# Patient Record
Sex: MALE | Race: WHITE | NOT HISPANIC OR LATINO | Employment: FULL TIME | URBAN - NONMETROPOLITAN AREA
[De-identification: names, ages, dates, MRNs, and addresses within clinical notes are randomized per-mention and may not be internally consistent; named-entity substitution may affect disease eponyms.]

---

## 2021-11-22 ENCOUNTER — OFFICE VISIT (OUTPATIENT)
Dept: FAMILY MEDICINE CLINIC | Facility: CLINIC | Age: 41
End: 2021-11-22

## 2021-11-22 VITALS
DIASTOLIC BLOOD PRESSURE: 78 MMHG | TEMPERATURE: 97.4 F | HEART RATE: 103 BPM | HEIGHT: 69 IN | BODY MASS INDEX: 20.44 KG/M2 | WEIGHT: 138 LBS | RESPIRATION RATE: 18 BRPM | OXYGEN SATURATION: 95 % | SYSTOLIC BLOOD PRESSURE: 111 MMHG

## 2021-11-22 DIAGNOSIS — F41.9 ANXIETY: ICD-10-CM

## 2021-11-22 DIAGNOSIS — L50.9 URTICARIA: ICD-10-CM

## 2021-11-22 DIAGNOSIS — Z86.2 HISTORY OF LEUKOCYTOSIS: ICD-10-CM

## 2021-11-22 DIAGNOSIS — Z86.2 HISTORY OF LEUKOCYTOSIS: Primary | ICD-10-CM

## 2021-11-22 DIAGNOSIS — J30.2 SEASONAL ALLERGIES: ICD-10-CM

## 2021-11-22 LAB
BASOPHILS # BLD AUTO: 0.08 10*3/MM3 (ref 0–0.2)
BASOPHILS NFR BLD AUTO: 0.5 % (ref 0–1.5)
DEPRECATED RDW RBC AUTO: 53.5 FL (ref 37–54)
EOSINOPHIL # BLD AUTO: 0.37 10*3/MM3 (ref 0–0.4)
EOSINOPHIL NFR BLD AUTO: 2.4 % (ref 0.3–6.2)
ERYTHROCYTE [DISTWIDTH] IN BLOOD BY AUTOMATED COUNT: 14.2 % (ref 12.3–15.4)
HCT VFR BLD AUTO: 44.3 % (ref 37.5–51)
HGB BLD-MCNC: 14.3 G/DL (ref 13–17.7)
IMM GRANULOCYTES # BLD AUTO: 0.05 10*3/MM3 (ref 0–0.05)
IMM GRANULOCYTES NFR BLD AUTO: 0.3 % (ref 0–0.5)
LYMPHOCYTES # BLD AUTO: 2.67 10*3/MM3 (ref 0.7–3.1)
LYMPHOCYTES NFR BLD AUTO: 17.2 % (ref 19.6–45.3)
MCH RBC QN AUTO: 32.8 PG (ref 26.6–33)
MCHC RBC AUTO-ENTMCNC: 32.3 G/DL (ref 31.5–35.7)
MCV RBC AUTO: 101.6 FL (ref 79–97)
MONOCYTES # BLD AUTO: 1.04 10*3/MM3 (ref 0.1–0.9)
MONOCYTES NFR BLD AUTO: 6.7 % (ref 5–12)
NEUTROPHILS NFR BLD AUTO: 11.28 10*3/MM3 (ref 1.7–7)
NEUTROPHILS NFR BLD AUTO: 72.9 % (ref 42.7–76)
NRBC BLD AUTO-RTO: 0 /100 WBC (ref 0–0.2)
PLATELET # BLD AUTO: 364 10*3/MM3 (ref 140–450)
PMV BLD AUTO: 10.9 FL (ref 6–12)
RBC # BLD AUTO: 4.36 10*6/MM3 (ref 4.14–5.8)
WBC NRBC COR # BLD: 15.49 10*3/MM3 (ref 3.4–10.8)

## 2021-11-22 PROCEDURE — 99203 OFFICE O/P NEW LOW 30 MIN: CPT | Performed by: NURSE PRACTITIONER

## 2021-11-22 PROCEDURE — 85025 COMPLETE CBC W/AUTO DIFF WBC: CPT | Performed by: NURSE PRACTITIONER

## 2021-11-22 RX ORDER — BUSPIRONE HYDROCHLORIDE 7.5 MG/1
7.5 TABLET ORAL 2 TIMES DAILY
Qty: 60 TABLET | Refills: 0 | Status: SHIPPED | OUTPATIENT
Start: 2021-11-22 | End: 2021-12-20

## 2021-11-22 RX ORDER — CETIRIZINE HYDROCHLORIDE 10 MG/1
10 TABLET ORAL DAILY
Qty: 30 TABLET | Refills: 2 | Status: SHIPPED | OUTPATIENT
Start: 2021-11-22 | End: 2022-01-24 | Stop reason: SDUPTHER

## 2021-11-22 RX ORDER — HYDROXYZINE HYDROCHLORIDE 25 MG/1
25 TABLET, FILM COATED ORAL 3 TIMES DAILY PRN
Qty: 90 TABLET | Refills: 2 | Status: SHIPPED | OUTPATIENT
Start: 2021-11-22 | End: 2022-01-03

## 2021-11-22 RX ORDER — BUSPIRONE HYDROCHLORIDE 7.5 MG/1
7.5 TABLET ORAL 2 TIMES DAILY
COMMUNITY
End: 2021-11-22 | Stop reason: SDUPTHER

## 2021-11-22 NOTE — PROGRESS NOTES
"Chief Complaint  Med Refill (Buspar for anxiety & (HTN?)) and CBC (Patients spouse wants him to have CBC done stating that WBC was over 30,000 and slowly went down.)    Subjective          Tobias Mariscal presents to [facility] for acute care (anxiety/leukocytosis).    Anxiety  Presents for initial visit. Onset was 6 to 12 months ago. The problem has been gradually improving. Symptoms include irritability and nervous/anxious behavior. Patient reports no chest pain, compulsions, confusion, decreased concentration, depressed mood, dizziness, dry mouth, excessive worry, feeling of choking, hyperventilation, impotence, insomnia, malaise, muscle tension, nausea, obsessions, palpitations, panic, restlessness, shortness of breath or suicidal ideas. Symptoms occur most days. The severity of symptoms is mild. The symptoms are aggravated by work stress (lack of sleep (gets woken up at night)). The quality of sleep is good. Nighttime awakenings: occasional.     His past medical history is significant for anxiety/panic attacks. There is no history of asthma, depression or suicide attempts. Treatments tried: Buspar. The treatment provided significant relief. Compliance with prior treatments has been good.       Objective   Vital Signs:   /78   Pulse 103   Temp 97.4 °F (36.3 °C) (Temporal)   Resp 18   Ht 175.3 cm (69\")   Wt 62.6 kg (138 lb)   SpO2 95%   BMI 20.38 kg/m²     Physical Exam  Vitals and nursing note reviewed.   Constitutional:       General: He is awake.      Appearance: Normal appearance.   HENT:      Head: Normocephalic.      Right Ear: Hearing and tympanic membrane normal.      Left Ear: Hearing and tympanic membrane normal. Swelling and tenderness present.      Nose: Nose normal.      Mouth/Throat:      Lips: Pink.      Mouth: Mucous membranes are moist.      Dentition: Abnormal dentition.      Pharynx: Posterior oropharyngeal erythema present. No pharyngeal swelling. "   Eyes:      General: Lids are normal.      Conjunctiva/sclera: Conjunctivae normal.      Pupils: Pupils are equal, round, and reactive to light.   Cardiovascular:      Rate and Rhythm: Normal rate and regular rhythm.      Heart sounds: Normal heart sounds.   Pulmonary:      Effort: Pulmonary effort is normal.      Breath sounds: Normal breath sounds.   Abdominal:      General: Abdomen is flat. Bowel sounds are normal.      Palpations: Abdomen is soft.      Tenderness: There is no abdominal tenderness.   Musculoskeletal:         General: Normal range of motion.      Cervical back: Normal range of motion.   Skin:     General: Skin is warm and dry.      Capillary Refill: Capillary refill takes less than 2 seconds.   Neurological:      Mental Status: He is alert and oriented to person, place, and time.      Sensory: Sensation is intact.      Motor: Motor function is intact.      Coordination: Coordination is intact.      Gait: Gait is intact.   Psychiatric:         Attention and Perception: Attention normal.         Mood and Affect: Affect normal. Mood is anxious.         Speech: Speech normal.         Behavior: Behavior normal. Behavior is cooperative.         Thought Content: Thought content normal.        Result Review :   The following data was reviewed by: WOODROW Sahu on 11/22/2021:      Assessment and Plan    Diagnoses and all orders for this visit:    1. History of leukocytosis (Primary)  -     CBC w AUTO Differential; Future    2. Urticaria  -     Ambulatory Referral to Allergy    3. Seasonal allergies  -     cetirizine (zyrTEC) 10 MG tablet; Take 1 tablet by mouth Daily.  Dispense: 30 tablet; Refill: 2    4. Anxiety  -     hydrOXYzine (ATARAX) 25 MG tablet; Take 1 tablet by mouth 3 (Three) Times a Day As Needed for Itching or Anxiety.  Dispense: 90 tablet; Refill: 2  -     busPIRone (BUSPAR) 7.5 MG tablet; Take 1 tablet by mouth 2 (Two) Times a Day.  Dispense: 60 tablet; Refill: 0    I spent 20 minutes  caring for Tobias on this date of service. This time includes time spent by me in the following activities:preparing for the visit, reviewing tests, obtaining and/or reviewing a separately obtained history, performing a medically appropriate examination and/or evaluation , counseling and educating the patient/family/caregiver, ordering medications, tests, or procedures, referring and communicating with other health care professionals , documenting information in the medical record, independently interpreting results and communicating that information with the patient/family/caregiver and care coordination  Follow Up   Return if symptoms worsen or fail to improve.  Patient was given instructions and counseling regarding his condition or for health maintenance advice. Please see specific information pulled into the AVS if appropriate.       This document has been electronically signed by WOODROW Sahu  November 22, 2021 14:23 EST

## 2021-11-22 NOTE — PATIENT INSTRUCTIONS
Allergic Rhinitis, Adult    Allergic rhinitis is an allergic reaction that affects the mucous membrane inside the nose. The mucous membrane is the tissue that produces mucus.  There are two types of allergic rhinitis:  · Seasonal. This type is also called hay fever and happens only during certain seasons.  · Perennial. This type can happen at any time of the year.  Allergic rhinitis cannot be spread from person to person. This condition can be mild, moderate, or severe. It can develop at any age and may be outgrown.  What are the causes?  This condition is caused by allergens. These are things that can cause an allergic reaction. Allergens may differ for seasonal allergic rhinitis and perennial allergic rhinitis.  · Seasonal allergic rhinitis is triggered by pollen. Pollen can come from grasses, trees, and weeds.  · Perennial allergic rhinitis may be triggered by:  ? Dust mites.  ? Proteins in a pet's urine, saliva, or dander. Dander is dead skin cells from a pet.  ? Smoke, mold, or car fumes.  What increases the risk?  You are more likely to develop this condition if you have a family history of allergies or other conditions related to allergies, including:  · Allergic conjunctivitis. This is inflammation of parts of the eyes and eyelids.  · Asthma. This condition affects the lungs and makes it hard to breathe.  · Atopic dermatitis or eczema. This is long term (chronic) inflammation of the skin.  · Food allergies.  What are the signs or symptoms?  Symptoms of this condition include:  · Sneezing or coughing.  · A stuffy nose (nasal congestion), itchy nose, or nasal discharge.  · Itchy eyes and tearing of the eyes.  · A feeling of mucus dripping down the back of your throat (postnasal drip).  · Trouble sleeping.  · Tiredness or fatigue.  · Headache.  · Sore throat.  How is this diagnosed?  This condition may be diagnosed with your symptoms, medical history, and physical exam. Your health care provider may check  for related conditions, such as:  · Asthma.  · Pink eye. This is eye inflammation caused by infection (conjunctivitis).  · Ear infection.  · Upper respiratory infection. This is an infection in the nose, throat, or upper airways.  You may also have tests to find out which allergens trigger your symptoms. These may include skin tests or blood tests.  How is this treated?  There is no cure for this condition, but treatment can help control symptoms. Treatment may include:  · Taking medicines that block allergy symptoms, such as corticosteroids and antihistamines. Medicine may be given as a shot, nasal spray, or pill.  · Avoiding any allergens.  · Being exposed again and again to tiny amounts of allergens to help you build a defense against allergens (immunotherapy). This is done if other treatments have not helped. It may include:  ? Allergy shots. These are injected medicines that have small amounts of allergen in them.  ? Sublingual immunotherapy. This involves taking small doses of a medicine with allergen in it under your tongue.  If these treatments do not work, your health care provider may prescribe newer, stronger medicines.  Follow these instructions at home:  Avoiding allergens  Find out what you are allergic to and avoid those allergens. These are some things you can do to help avoid allergens:  · If you have perennial allergies:  ? Replace carpet with wood, tile, or vinyl devon. Carpet can trap dander and dust.  ? Do not smoke. Do not allow smoking in your home.  ? Change your heating and air conditioning filters at least once a month.  · If you have seasonal allergies, take these steps during allergy season:  ? Keep windows closed as much as possible.  ? Plan outdoor activities when pollen counts are lowest. Check pollen counts before you plan outdoor activities.  ? When coming indoors, change clothing and shower before sitting on furniture or bedding.  · If you have a pet in the house that produces  allergens:  ? Keep the pet out of the bedroom.  ? Vacuum, sweep, and dust regularly.  General instructions  · Take over-the-counter and prescription medicines only as told by your health care provider.  · Drink enough fluid to keep your urine pale yellow.  · Keep all follow-up visits as told by your health care provider. This is important.  Where to find more information  · American Academy of Allergy, Asthma & Immunology: www.aaaai.org  Contact a health care provider if:  · You have a fever.  · You develop a cough that does not go away.  · You make whistling sounds when you breathe (wheeze).  · Your symptoms slow you down or stop you from doing your normal activities each day.  Get help right away if:  · You have shortness of breath.  This symptom may represent a serious problem that is an emergency. Do not wait to see if the symptom will go away. Get medical help right away. Call your local emergency services (911 in the [place] Do not drive yourself to the hospital.  Summary  · Allergic rhinitis may be managed by taking medicines as directed and avoiding allergens.  · If you have seasonal allergies, keep windows closed as much as possible during allergy season.  · Contact your health care provider if you develop a fever or a cough that does not go away.  This information is not intended to replace advice given to you by your health care provider. Make sure you discuss any questions you have with your health care provider.  Document Revised: 02/05/2021 Document Reviewed: 12/15/2020  [employer] Patient Education © 2021 [employer]    Managing Anxiety, Adult  After being diagnosed with an anxiety disorder, you may be relieved to know why you have felt or behaved a certain way. You may also feel overwhelmed about the treatment ahead and what it will mean for your life. With care and support, you can manage this condition and recover from it.  How to manage lifestyle changes  Managing stress and anxiety    Stress is  your body's reaction to life changes and events, both good and bad. Most stress will last just a few hours, but stress can be ongoing and can lead to more than just stress. Although stress can play a major role in anxiety, it is not the same as anxiety. Stress is usually caused by something external, such as a deadline, test, or competition. Stress normally passes after the triggering event has ended.   Anxiety is caused by something internal, such as imagining a terrible outcome or worrying that something will go wrong that will devastate you. Anxiety often does not go away even after the triggering event is over, and it can become long-term (chronic) worry. It is important to understand the differences between stress and anxiety and to manage your stress effectively so that it does not lead to an anxious response.  Talk with your health care provider or a counselor to learn more about reducing anxiety and stress. He or she may suggest tension reduction techniques, such as:  · Music therapy. This can include creating or listening to music that you enjoy and that inspires you.  · Mindfulness-based meditation. This involves being aware of your normal breaths while not trying to control your breathing. It can be done while sitting or walking.  · Centering prayer. This involves focusing on a word, phrase, or sacred image that means something to you and brings you peace.  · Deep breathing. To do this, expand your stomach and inhale slowly through your nose. Hold your breath for 3-5 seconds. Then exhale slowly, letting your stomach muscles relax.  · Self-talk. This involves identifying thought patterns that lead to anxiety reactions and changing those patterns.  · Muscle relaxation. This involves tensing muscles and then relaxing them.  Choose a tension reduction technique that suits your lifestyle and personality. These techniques take time and practice. Set aside 5-15 minutes a day to do them. Therapists can offer  counseling and training in these techniques. The training to help with anxiety may be covered by some insurance plans. Other things you can do to manage stress and anxiety include:  · Keeping a stress/anxiety diary. This can help you learn what triggers your reaction and then learn ways to manage your response.  · Thinking about how you react to certain situations. You may not be able to control everything, but you can control your response.  · Making time for activities that help you relax and not feeling guilty about spending your time in this way.  · Visual imagery and yoga can help you stay calm and relax.    Medicines  Medicines can help ease symptoms. Medicines for anxiety include:  · Anti-anxiety drugs.  · Antidepressants.  Medicines are often used as a primary treatment for anxiety disorder. Medicines will be prescribed by a health care provider. When used together, medicines, psychotherapy, and tension reduction techniques may be the most effective treatment.  Relationships  Relationships can play a big part in helping you recover. Try to spend more time connecting with trusted friends and family members. Consider going to couples counseling, taking family education classes, or going to family therapy. Therapy can help you and others better understand your condition.  How to recognize changes in your anxiety  Everyone responds differently to treatment for anxiety. Recovery from anxiety happens when symptoms decrease and stop interfering with your daily activities at home or work. This may mean that you will start to:  · Have better concentration and focus. Worry will interfere less in your daily thinking.  · Sleep better.  · Be less irritable.  · Have more energy.  · Have improved memory.  It is important to recognize when your condition is getting worse. Contact your health care provider if your symptoms interfere with home or work and you feel like your condition is not improving.  Follow these  instructions at home:  Activity  · Exercise. Most adults should do the following:  ? Exercise for at least 150 minutes each week. The exercise should increase your heart rate and make you sweat (moderate-intensity exercise).  ? Strengthening exercises at least twice a week.  · Get the right amount and quality of sleep. Most adults need 7-9 hours of sleep each night.  Lifestyle    · Eat a healthy diet that includes plenty of vegetables, fruits, whole grains, low-fat dairy products, and lean protein. Do not eat a lot of foods that are high in solid fats, added sugars, or salt.  · Make choices that simplify your life.  · Do not use any products that contain nicotine or tobacco, such as cigarettes, e-cigarettes, and chewing tobacco. If you need help quitting, ask your health care provider.  · Avoid caffeine, alcohol, and certain over-the-counter cold medicines. These may make you feel worse. Ask your pharmacist which medicines to avoid.    General instructions  · Take over-the-counter and prescription medicines only as told by your health care provider.  · Keep all follow-up visits as told by your health care provider. This is important.  Where to find support  You can get help and support from these sources:  · Self-help groups.  · Online and community organizations.  · A trusted spiritual leader.  · Couples counseling.  · Family education classes.  · Family therapy.  Where to find more information  You may find that joining a support group helps you deal with your anxiety. The following sources can help you locate counselors or support groups near you:  · Mental Health [place]: www.mentalhealthamerica.net  · Anxiety and Depression Association of [place] (ADAA): www.adaa.org  · National [place] [employer] Mental Illness (ANA): www.ana.org  Contact a health care provider if you:  · Have a hard time staying focused or finishing daily tasks.  · Spend many hours a day feeling worried about everyday life.  · Become exhausted by  worry.  · Start to have headaches, feel tense, or have nausea.  · Urinate more than normal.  · Have diarrhea.  Get help right away if you have:  · A racing heart and shortness of breath.  · Thoughts of hurting yourself or others.  If you ever feel like you may hurt yourself or others, or have thoughts about taking your own life, get help right away. You can go to your nearest emergency department or call:  · Your local emergency services (911 in the [place]  · A suicide crisis helpline, such as the National Suicide Prevention Lifeline at 1-632.819.2256. This is open 24 hours a day.  Summary  · Taking steps to learn and use tension reduction techniques can help calm you and help prevent triggering an anxiety reaction.  · When used together, medicines, psychotherapy, and tension reduction techniques may be the most effective treatment.  · Family, friends, and partners can play a big part in helping you recover from an anxiety disorder.  This information is not intended to replace advice given to you by your health care provider. Make sure you discuss any questions you have with your health care provider.  Document Revised: 05/19/2020 Document Reviewed: 05/19/2020  [employer] Patient Education © 2021 [employer]    Hives  Hives (urticaria) are itchy, red, swollen areas on the skin. Hives can appear on any part of the body. Hives often fade within 24 hours (acute hives). Sometimes, new hives appear after old ones fade and the cycle can continue for several days or weeks (chronic hives). Hives do not spread from person to person (are not contagious).  Hives come from the body's reaction to something a person is allergic to (allergen), something that causes irritation, or various other triggers. When a person is exposed to a trigger, his or her body releases a chemical (histamine) that causes redness, itching, and swelling. Hives can appear right after exposure to a trigger or hours later.  What are the causes?  This  condition may be caused by:  · Allergies to foods or ingredients.  · Insect bites or stings.  · Exposure to pollen or pets.  · Contact with latex or chemicals.  · Spending time in sunlight, heat, or cold (exposure).  · Exercise.  · Stress.  · Certain medicines.  You can also get hives from other medical conditions and treatments, such as:  · Viruses, including the common cold.  · Bacterial infections, such as urinary tract infections and strep throat.  · Certain medicines.  · Allergy shots.  · Blood transfusions.  Sometimes, the cause of this condition is not known (idiopathic hives).  What increases the risk?  You are more likely to develop this condition if you:  · Are a woman.  · Have food allergies, especially to citrus fruits, milk, eggs, peanuts, tree nuts, or shellfish.  · Are allergic to:  ? Medicines.  ? Latex.  ? Insects.  ? Animals.  ? Pollen.  What are the signs or symptoms?  Common symptoms of this condition include raised, itchy, red or white bumps or patches on your skin. These areas may:  · Become large and swollen (welts).  · Change in shape and location, quickly and repeatedly.  · Be separate hives or connect over a large area of skin.  · Sting or become painful.  · Turn white when pressed in the center (padilla).  In severe cases, your hands, feet, and face may also become swollen. This may occur if hives develop deeper in your skin.  How is this diagnosed?  This condition may be diagnosed by your symptoms, medical history, and physical exam.  · Your skin, urine, or blood may be tested to find out what is causing your hives and to rule out other health issues.  · Your health care provider may also remove a small sample of skin from the affected area and examine it under a microscope (biopsy).  How is this treated?  Treatment for this condition depends on the cause and severity of your symptoms. Your health care provider may recommend using cool, wet cloths (cool compresses) or taking cool showers  to relieve itching. Treatment may include:  · Medicines that help:  ? Relieve itching (antihistamines).  ? Reduce swelling (corticosteroids).  ? Treat infection (antibiotics).  · An injectable medicine (omalizumab). Your health care provider may prescribe this if you have chronic idiopathic hives and you continue to have symptoms even after treatment with antihistamines.  Severe cases may require an emergency injection of adrenaline (epinephrine) to prevent a life-threatening allergic reaction (anaphylaxis).  Follow these instructions at home:  Medicines  · Take and apply over-the-counter and prescription medicines only as told by your health care provider.  · If you were prescribed an antibiotic medicine, take it as told by your health care provider. Do not stop using the antibiotic even if you start to feel better.  Skin care  · Apply cool compresses to the affected areas.  · Do not scratch or rub your skin.  General instructions  · Do not take hot showers or baths. This can make itching worse.  · Do not wear tight-fitting clothing.  · Use sunscreen and wear protective clothing when you are outside.  · Avoid any substances that cause your hives. Keep a journal to help track what causes your hives. Write down:  ? What medicines you take.  ? What you eat and drink.  ? What products you use on your skin.  · Keep all follow-up visits as told by your health care provider. This is important.  Contact a health care provider if:  · Your symptoms are not controlled with medicine.  · Your joints are painful or swollen.  Get help right away if:  · You have a fever.  · You have pain in your abdomen.  · Your tongue or lips are swollen.  · Your eyelids are swollen.  · Your chest or throat feels tight.  · You have trouble breathing or swallowing.  These symptoms may represent a serious problem that is an emergency. Do not wait to see if the symptoms will go away. Get medical help right away. Call your local emergency services  (911 in the [place] Do not drive yourself to the hospital.  Summary  · Hives (urticaria) are itchy, red, swollen areas on your skin. Hives come from the body's reaction to something a person is allergic to (allergen), something that causes irritation, or various other triggers.  · Treatment for this condition depends on the cause and severity of your symptoms.  · Avoid any substances that cause your hives. Keep a journal to help track what causes your hives.  · Take and apply over-the-counter and prescription medicines only as told by your health care provider.  · Keep all follow-up visits as told by your health care provider. This is important.  This information is not intended to replace advice given to you by your health care provider. Make sure you discuss any questions you have with your health care provider.  Document Revised: 07/03/2019 Document Reviewed: 07/03/2019  [employer] Patient Education © 2021 [employer]

## 2021-11-24 ENCOUNTER — PATIENT ROUNDING (BHMG ONLY) (OUTPATIENT)
Dept: FAMILY MEDICINE CLINIC | Facility: CLINIC | Age: 41
End: 2021-11-24

## 2021-11-24 NOTE — PROGRESS NOTES
November 24, 2021    Hello, may I speak with Tobias Maricsal?    My name is John Ulrich    I am  with [facility]  [address] [address] [phone].    Before we get started may I verify your date of birth? 1980    I am calling to officially welcome you to our practice and ask about your recent visit. Is this a good time to talk?     YES    Tell me about your visit with us. What things went well?    Things went really well Princess was very polite. The staff was great. If I had not misplaced my insurance card the wait time would not have been so long.       We're always looking for ways to make our patients' experiences even better. Do you have recommendations on ways we may improve?      No everything was great    Overall were you satisfied with your first visit to our practice?     YES       I appreciate you taking the time to speak with me today. Is there anything else I can do for you?     Nothing I can think of every thing was great.      Thank you, and have a great day.

## 2021-12-20 ENCOUNTER — OFFICE VISIT (OUTPATIENT)
Dept: FAMILY MEDICINE CLINIC | Facility: CLINIC | Age: 41
End: 2021-12-20

## 2021-12-20 VITALS
HEIGHT: 69 IN | OXYGEN SATURATION: 99 % | HEART RATE: 95 BPM | SYSTOLIC BLOOD PRESSURE: 136 MMHG | DIASTOLIC BLOOD PRESSURE: 76 MMHG | BODY MASS INDEX: 20.97 KG/M2 | TEMPERATURE: 98.2 F | WEIGHT: 141.6 LBS

## 2021-12-20 DIAGNOSIS — R20.2 NUMBNESS AND TINGLING OF RIGHT HAND: ICD-10-CM

## 2021-12-20 DIAGNOSIS — Z00.00 ANNUAL PHYSICAL EXAM: ICD-10-CM

## 2021-12-20 DIAGNOSIS — F41.1 GENERALIZED ANXIETY DISORDER: ICD-10-CM

## 2021-12-20 DIAGNOSIS — Z00.00 ENCOUNTER FOR MEDICAL EXAMINATION TO ESTABLISH CARE: Primary | ICD-10-CM

## 2021-12-20 DIAGNOSIS — F41.9 ANXIETY: ICD-10-CM

## 2021-12-20 DIAGNOSIS — I10 HYPERTENSION, UNSPECIFIED TYPE: ICD-10-CM

## 2021-12-20 DIAGNOSIS — Z23 FLU VACCINE NEED: ICD-10-CM

## 2021-12-20 DIAGNOSIS — R20.0 NUMBNESS AND TINGLING OF RIGHT HAND: ICD-10-CM

## 2021-12-20 DIAGNOSIS — Z23 NEED FOR TDAP VACCINATION: ICD-10-CM

## 2021-12-20 PROBLEM — L30.9 CHRONIC ECZEMA: Status: ACTIVE | Noted: 2021-12-20

## 2021-12-20 PROBLEM — J30.89 ENVIRONMENTAL AND SEASONAL ALLERGIES: Status: ACTIVE | Noted: 2021-12-20

## 2021-12-20 LAB
ALBUMIN SERPL-MCNC: 4.33 G/DL (ref 3.5–5.2)
ALBUMIN/GLOB SERPL: 1.8 G/DL
ALP SERPL-CCNC: 126 U/L (ref 39–117)
ALT SERPL W P-5'-P-CCNC: 17 U/L (ref 1–41)
ANION GAP SERPL CALCULATED.3IONS-SCNC: 12.9 MMOL/L (ref 5–15)
AST SERPL-CCNC: 21 U/L (ref 1–40)
BASOPHILS # BLD AUTO: 0.12 10*3/MM3 (ref 0–0.2)
BASOPHILS NFR BLD AUTO: 0.5 % (ref 0–1.5)
BILIRUB BLD-MCNC: ABNORMAL MG/DL
BILIRUB SERPL-MCNC: 0.3 MG/DL (ref 0–1.2)
BUN SERPL-MCNC: 12 MG/DL (ref 6–20)
BUN/CREAT SERPL: 14.6 (ref 7–25)
CALCIUM SPEC-SCNC: 8.4 MG/DL (ref 8.6–10.5)
CHLORIDE SERPL-SCNC: 100 MMOL/L (ref 98–107)
CHOLEST SERPL-MCNC: 162 MG/DL (ref 0–200)
CLARITY, POC: CLEAR
CO2 SERPL-SCNC: 25.1 MMOL/L (ref 22–29)
COLOR UR: ABNORMAL
CREAT SERPL-MCNC: 0.82 MG/DL (ref 0.76–1.27)
DEPRECATED RDW RBC AUTO: 51 FL (ref 37–54)
EOSINOPHIL # BLD AUTO: 0.32 10*3/MM3 (ref 0–0.4)
EOSINOPHIL NFR BLD AUTO: 1.4 % (ref 0.3–6.2)
ERYTHROCYTE [DISTWIDTH] IN BLOOD BY AUTOMATED COUNT: 13.4 % (ref 12.3–15.4)
GFR SERPL CREATININE-BSD FRML MDRD: 104 ML/MIN/1.73
GLOBULIN UR ELPH-MCNC: 2.5 GM/DL
GLUCOSE SERPL-MCNC: 74 MG/DL (ref 65–99)
GLUCOSE UR STRIP-MCNC: NEGATIVE MG/DL
HCT VFR BLD AUTO: 45.1 % (ref 37.5–51)
HCV AB SER DONR QL: NORMAL
HDLC SERPL-MCNC: 44 MG/DL (ref 40–60)
HGB BLD-MCNC: 14.6 G/DL (ref 13–17.7)
IMM GRANULOCYTES # BLD AUTO: 0.08 10*3/MM3 (ref 0–0.05)
IMM GRANULOCYTES NFR BLD AUTO: 0.4 % (ref 0–0.5)
KETONES UR QL: NEGATIVE
LDLC SERPL CALC-MCNC: 95 MG/DL (ref 0–100)
LDLC/HDLC SERPL: 2.08 {RATIO}
LEUKOCYTE EST, POC: NEGATIVE
LYMPHOCYTES # BLD AUTO: 2.45 10*3/MM3 (ref 0.7–3.1)
LYMPHOCYTES NFR BLD AUTO: 11 % (ref 19.6–45.3)
MCH RBC QN AUTO: 33 PG (ref 26.6–33)
MCHC RBC AUTO-ENTMCNC: 32.4 G/DL (ref 31.5–35.7)
MCV RBC AUTO: 101.8 FL (ref 79–97)
MONOCYTES # BLD AUTO: 1.45 10*3/MM3 (ref 0.1–0.9)
MONOCYTES NFR BLD AUTO: 6.5 % (ref 5–12)
NEUTROPHILS NFR BLD AUTO: 17.91 10*3/MM3 (ref 1.7–7)
NEUTROPHILS NFR BLD AUTO: 80.2 % (ref 42.7–76)
NITRITE UR-MCNC: NEGATIVE MG/ML
NRBC BLD AUTO-RTO: 0 /100 WBC (ref 0–0.2)
PH UR: 6 [PH] (ref 5–8)
PLATELET # BLD AUTO: 389 10*3/MM3 (ref 140–450)
PMV BLD AUTO: 11.1 FL (ref 6–12)
POTASSIUM SERPL-SCNC: 4.2 MMOL/L (ref 3.5–5.2)
PROT SERPL-MCNC: 6.8 G/DL (ref 6–8.5)
PROT UR STRIP-MCNC: ABNORMAL MG/DL
RBC # BLD AUTO: 4.43 10*6/MM3 (ref 4.14–5.8)
RBC # UR STRIP: NEGATIVE /UL
SODIUM SERPL-SCNC: 138 MMOL/L (ref 136–145)
SP GR UR: 1.02 (ref 1–1.03)
TRIGL SERPL-MCNC: 132 MG/DL (ref 0–150)
TSH SERPL DL<=0.05 MIU/L-ACNC: 3.97 UIU/ML (ref 0.27–4.2)
UROBILINOGEN UR QL: NORMAL
VLDLC SERPL-MCNC: 23 MG/DL (ref 5–40)
WBC NRBC COR # BLD: 22.33 10*3/MM3 (ref 3.4–10.8)

## 2021-12-20 PROCEDURE — 82607 VITAMIN B-12: CPT | Performed by: PSYCHOLOGIST

## 2021-12-20 PROCEDURE — 99396 PREV VISIT EST AGE 40-64: CPT | Performed by: PSYCHOLOGIST

## 2021-12-20 PROCEDURE — 90472 IMMUNIZATION ADMIN EACH ADD: CPT | Performed by: PSYCHOLOGIST

## 2021-12-20 PROCEDURE — 90686 IIV4 VACC NO PRSV 0.5 ML IM: CPT | Performed by: PSYCHOLOGIST

## 2021-12-20 PROCEDURE — 84443 ASSAY THYROID STIM HORMONE: CPT | Performed by: PSYCHOLOGIST

## 2021-12-20 PROCEDURE — 85025 COMPLETE CBC W/AUTO DIFF WBC: CPT | Performed by: PSYCHOLOGIST

## 2021-12-20 PROCEDURE — 86803 HEPATITIS C AB TEST: CPT | Performed by: PSYCHOLOGIST

## 2021-12-20 PROCEDURE — 80061 LIPID PANEL: CPT | Performed by: PSYCHOLOGIST

## 2021-12-20 PROCEDURE — 90471 IMMUNIZATION ADMIN: CPT | Performed by: PSYCHOLOGIST

## 2021-12-20 PROCEDURE — 82306 VITAMIN D 25 HYDROXY: CPT | Performed by: PSYCHOLOGIST

## 2021-12-20 PROCEDURE — 81002 URINALYSIS NONAUTO W/O SCOPE: CPT | Performed by: PSYCHOLOGIST

## 2021-12-20 PROCEDURE — 90715 TDAP VACCINE 7 YRS/> IM: CPT | Performed by: PSYCHOLOGIST

## 2021-12-20 PROCEDURE — 80053 COMPREHEN METABOLIC PANEL: CPT | Performed by: PSYCHOLOGIST

## 2021-12-20 RX ORDER — BUSPIRONE HYDROCHLORIDE 7.5 MG/1
TABLET ORAL
Qty: 60 TABLET | Refills: 0 | Status: SHIPPED | OUTPATIENT
Start: 2021-12-20 | End: 2022-01-24 | Stop reason: SDUPTHER

## 2021-12-20 NOTE — PROGRESS NOTES
Chief Complaint  Establish Care (Physical)    Subjective          Tobias Mariscal presents to [facility] c/o establish care as his PCP 2. Annual Physical 3. Chronic Illness 4. Right wrist/hand  pain  Anxiety  Presents for initial visit. Onset was 1 to 5 years ago. The problem has been waxing and waning. Patient reports no decreased concentration, depressed mood, irritability, nervous/anxious behavior, shortness of breath or suicidal ideas. Symptoms occur rarely. The severity of symptoms is mild. The symptoms are aggravated by work stress. The patient sleeps 7 hours per night. The quality of sleep is good. Nighttime awakenings: none.     His past medical history is significant for anxiety/panic attacks and depression. Past treatments include SSRIs. The treatment provided moderate relief. Compliance with prior treatments has been good.   Hypertension  This is a new problem. The current episode started 1 to 4 weeks ago. The problem has been waxing and waning since onset. The problem is uncontrolled. Associated symptoms include anxiety and sweats. Pertinent negatives include no headaches or shortness of breath. Risk factors for coronary artery disease include stress, smoking/tobacco exposure and male gender. Past treatments include lifestyle changes. Current antihypertension treatment includes lifestyle changes. The current treatment provides moderate improvement. There are no compliance problems.  There is no history of angina, kidney disease or CAD/MI.   Hand Pain   The incident occurred more than 1 week ago. There was no injury mechanism. The pain is present in the right fingers and right hand. The quality of the pain is described as aching. The pain has been intermittent since the incident. Associated symptoms include numbness (first 3 digits) and tingling. The symptoms are aggravated by movement. He has tried nothing for the symptoms.       Objective   Vital Signs:   /76 (BP  "Location: Right arm, Patient Position: Sitting, Cuff Size: Adult)   Pulse 95   Temp 98.2 °F (36.8 °C) (Temporal)   Ht 175.3 cm (69\")   Wt 64.2 kg (141 lb 9.6 oz)   SpO2 99%   BMI 20.91 kg/m²     Physical Exam  Vitals and nursing note reviewed.   Constitutional:       General: He is awake.      Appearance: Normal appearance. He is well-developed, well-groomed and normal weight.   HENT:      Head: Normocephalic and atraumatic.      Jaw: There is normal jaw occlusion.      Nose: Nose normal.      Mouth/Throat:      Mouth: Mucous membranes are moist.      Pharynx: Oropharynx is clear.   Eyes:      General: Lids are normal. Vision grossly intact. Gaze aligned appropriately.      Extraocular Movements: Extraocular movements intact.      Conjunctiva/sclera: Conjunctivae normal.      Pupils: Pupils are equal, round, and reactive to light.   Neck:      Trachea: Trachea and phonation normal.   Cardiovascular:      Rate and Rhythm: Normal rate and regular rhythm.      Pulses: Normal pulses.      Heart sounds: Normal heart sounds.   Pulmonary:      Effort: Pulmonary effort is normal.      Breath sounds: Normal breath sounds.   Abdominal:      General: Abdomen is flat. Bowel sounds are normal.      Palpations: Abdomen is soft.   Genitourinary:     Rectum: Normal.   Musculoskeletal:      Right wrist: Tenderness present. Decreased range of motion.      Right hand: Tenderness present.      Cervical back: Full passive range of motion without pain, normal range of motion and neck supple.   Lymphadenopathy:      Cervical: No cervical adenopathy.   Skin:     General: Skin is warm.      Capillary Refill: Capillary refill takes less than 2 seconds.   Neurological:      General: No focal deficit present.      Mental Status: He is alert and oriented to person, place, and time.      Cranial Nerves: Cranial nerves are intact.      Sensory: Sensation is intact.      Motor: Motor function is intact.      Coordination: Coordination is " intact.      Gait: Gait is intact.      Deep Tendon Reflexes: Reflexes are normal and symmetric.   Psychiatric:         Attention and Perception: Attention and perception normal.         Mood and Affect: Mood and affect normal.         Speech: Speech normal.         Behavior: Behavior normal.         Thought Content: Thought content normal.         Cognition and Memory: Cognition and memory normal.         Judgment: Judgment normal.      Right hand/wrist X-ray was performed this visit.  Indication: pain and numbness of right hand/wrist  Interpretation/Findings: Some bony abnormalities seen but no acute fx/ an old fifht MCP fx seen.   No comparison or previous X-ray was looked at today.   Narrative & Impression   EXAMINATION: XR WRIST 3+ VW RIGHT-      CLINICAL INDICATION: pain of wrist; R20.0-Anesthesia of skin;  R20.2-Paresthesia of skin        COMPARISON: None available     FINDINGS:  4 views of the right wrist, 1 minute and ulnar deviation view     No fracture or dislocation     Cystic change in the scaphoid.     IMPRESSION:  1. Cystic change in the scaphoid but no acute fracture      This report was finalized on 12/20/2021 3:37 PM by Dr. Domingo Hawkins MD.     Narrative & Impression   EXAMINATION: XR HAND 3+ VW RIGHT-      CLINICAL INDICATION: right wrist pain; R20.0-Anesthesia of skin;  R20.2-Paresthesia of skin        COMPARISON: None available     FINDINGS:  3 views of the right hand show no evidence of an acute fracture or  dislocation. There are no blastic or lytic lesions.     IMPRESSION:  Evidence of an old right fifth metacarpal fracture, but no  acute fracture is seen      This report was finalized on 12/20/2021 3:21 PM by Dr. Domingo Hawkins MD.            Result Review :   The following data was reviewed by: Vasiliy Santos MD on 12/20/2021:  CBC    CBC 11/22/21   WBC 15.49 (A)   RBC 4.36   Hemoglobin 14.3   Hematocrit 44.3   .6 (A)   MCH 32.8   MCHC 32.3   RDW 14.2   Platelets 364   (A)  Abnormal value                   Assessment and Plan    Diagnoses and all orders for this visit:    1. Encounter for medical examination to establish care (Primary)    2. Annual physical exam  -     CBC & Differential  -     Comprehensive Metabolic Panel  -     Lipid Panel  -     TSH  -     Vitamin D 25 Hydroxy  -     POC Urinalysis Dipstick  -     Vitamin B12  -     Hepatitis C Antibody    3. Flu vaccine need    4. Need for Tdap vaccination    5. Hypertension, unspecified type  Assessment & Plan:  Hypertension is improving with treatment.  Continue current treatment regimen.  Dietary sodium restriction.  Weight loss.  Regular aerobic exercise.  Stop smoking.  Blood pressure will be reassessed in 2 weeks.      6. Generalized anxiety disorder  Assessment & Plan:  Psychological condition is improving with treatment.  Continue current treatment regimen.  Psychological condition  will be reassessed in 2 weeks.      7. Numbness and tingling of right hand  Assessment & Plan:  Recent numbness and tingling noted/ denies any injury or trauma.Will try cock-up splint for now and NSAIDs. Re-evaluate in 2 weeks.     Orders:  -     XR Hand 3+ View Right; Future  -     XR Wrist 3+ View Right; Future    Other orders  -     Flu Vaccine Quad PF 3YR+  -     Tdap Vaccine Greater Than or Equal To 8yo IM  -     diclofenac (VOLTAREN) 50 MG EC tablet; Take 1 tablet by mouth 2 (Two) Times a Day With Meals for 30 days.  Dispense: 60 tablet; Refill: 0    I spent 20 minutes caring for Tobias on this date of service. This time includes time spent by me in the following activities:reviewing tests, performing a medically appropriate examination and/or evaluation , counseling and educating the patient/family/caregiver, ordering medications, tests, or procedures and documenting information in the medical record     Follow Up   Return in about 2 weeks (around 1/3/2022) for Recheck/ d/w patient smoking cessation/ htn/anxiety/ med refill.  Patient was  given instructions and counseling regarding his condition or for health maintenance advice. Please see specific information pulled into the AVS if appropriate.         This document has been electronically signed by Vasiliy Santos MD  December 20, 2021 15:54 EST

## 2021-12-20 NOTE — ASSESSMENT & PLAN NOTE
Hypertension is improving with treatment.  Continue current treatment regimen.  Dietary sodium restriction.  Weight loss.  Regular aerobic exercise.  Stop smoking.  Blood pressure will be reassessed in 2 weeks.

## 2021-12-20 NOTE — ASSESSMENT & PLAN NOTE
Psychological condition is improving with treatment.  Continue current treatment regimen.  Psychological condition  will be reassessed in 2 weeks.

## 2021-12-20 NOTE — ASSESSMENT & PLAN NOTE
Recent numbness and tingling noted/ denies any injury or trauma.Will try cock-up splint for now and NSAIDs. Re-evaluate in 2 weeks.

## 2021-12-21 LAB
25(OH)D3 SERPL-MCNC: 16 NG/ML (ref 30–100)
VIT B12 BLD-MCNC: 1704 PG/ML (ref 211–946)

## 2021-12-23 DIAGNOSIS — D72.829 LEUKOCYTOSIS, UNSPECIFIED TYPE: Primary | ICD-10-CM

## 2022-01-03 ENCOUNTER — OFFICE VISIT (OUTPATIENT)
Dept: FAMILY MEDICINE CLINIC | Facility: CLINIC | Age: 42
End: 2022-01-03

## 2022-01-03 VITALS
OXYGEN SATURATION: 100 % | HEIGHT: 69 IN | HEART RATE: 95 BPM | TEMPERATURE: 100.2 F | RESPIRATION RATE: 20 BRPM | BODY MASS INDEX: 21.3 KG/M2 | WEIGHT: 143.8 LBS | DIASTOLIC BLOOD PRESSURE: 70 MMHG | SYSTOLIC BLOOD PRESSURE: 170 MMHG

## 2022-01-03 DIAGNOSIS — I10 PRIMARY HYPERTENSION: Primary | ICD-10-CM

## 2022-01-03 DIAGNOSIS — M25.562 ACUTE PAIN OF LEFT KNEE: ICD-10-CM
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
## 2022-01-03 DIAGNOSIS — F41.1 GENERALIZED ANXIETY DISORDER: ICD-10-CM

## 2022-01-03 PROCEDURE — 99213 OFFICE O/P EST LOW 20 MIN: CPT | Performed by: PSYCHOLOGIST

## 2022-01-03 PROCEDURE — 93000 ELECTROCARDIOGRAM COMPLETE: CPT | Performed by: PSYCHOLOGIST

## 2022-01-03 RX ORDER — LISINOPRIL 10 MG/1
10 TABLET ORAL
Qty: 30 TABLET | Refills: 0 | Status: SHIPPED | OUTPATIENT
Start: 2022-01-03 | End: 2022-01-24 | Stop reason: SDUPTHER

## 2022-01-03 NOTE — ASSESSMENT & PLAN NOTE
Hypertension is worsening.  Dietary sodium restriction.  Weight loss.  Regular aerobic exercise.  Stop smoking.  Medication changes per orders.  Blood pressure will be reassessed in 2 weeks.

## 2022-01-03 NOTE — PROGRESS NOTES
Chief Complaint  Follow-up (Anxiety; h/o elevated white count), Fall (Today, injured left knee), and Nasal Congestion (2-3 days)    Subjective          Tobias Mariscal presents to [facility] c/o follow up today for HTN/ANxiety   2. Recent fall - NO loc 3. Smoking cessation --- still thinking about it not quite ready yet.   Fall  The accident occurred 1 to 3 hours ago. The fall occurred while walking. He landed on hard floor. The point of impact was the left knee. The pain is present in the left knee. The pain is at a severity of 8/10. The pain is moderate. The symptoms are aggravated by ambulation, standing and movement. Pertinent negatives include no headaches, numbness or tingling. He has tried nothing for the symptoms.   Hypertension  This is a new problem. The current episode started 1 to 4 weeks ago. The problem has been waxing and waning since onset. The problem is uncontrolled. Associated symptoms include anxiety. Pertinent negatives include no chest pain, headaches, palpitations or shortness of breath. Risk factors for coronary artery disease include male gender and smoking/tobacco exposure. Past treatments include lifestyle changes. Current antihypertension treatment includes lifestyle changes and ACE inhibitors. There is no history of angina, kidney disease or CAD/MI.   Anxiety  Presents for follow-up visit. Patient reports no chest pain, decreased concentration, depressed mood, irritability, nervous/anxious behavior, palpitations, shortness of breath or suicidal ideas. The severity of symptoms is mild. The patient sleeps 7 hours per night. The quality of sleep is good. Nighttime awakenings: none.     Compliance with medications is %.   Nicotine Dependence  Presents for initial visit. Symptoms are negative for decreased concentration and irritability. Preferred tobacco types include cigarettes. Preferred cigarette types include filtered. Preferred strength is regular.  "Preferred cigarettes are non-menthol. Preferred brands include Basic. His urge triggers include driving, meal time and stress. His first smoke is from 6 to 8 AM. He smokes 1 pack of cigarettes per day. He started smoking when he was >18 years old. Past treatments include buproprion. The treatment provided no relief. Tobias is thinking about quitting. Tobias has tried to quit 1 time. There is no history of alcohol abuse and drug use.       Objective   Vital Signs:   /70 (BP Location: Right arm, Patient Position: Sitting, Cuff Size: Adult)   Pulse 95   Temp 100.2 °F (37.9 °C) (Temporal)   Resp 20   Ht 175.3 cm (69\")   Wt 65.2 kg (143 lb 12.8 oz)   SpO2 100%   BMI 21.24 kg/m²     Physical Exam  Vitals and nursing note reviewed.   Constitutional:       General: He is awake.      Appearance: Normal appearance. He is well-developed, well-groomed and normal weight.   HENT:      Head: Normocephalic and atraumatic.      Jaw: There is normal jaw occlusion.      Nose: Nose normal.      Mouth/Throat:      Mouth: Mucous membranes are moist.      Pharynx: Oropharynx is clear.   Eyes:      General: Lids are normal. Vision grossly intact. Gaze aligned appropriately.      Extraocular Movements: Extraocular movements intact.      Conjunctiva/sclera: Conjunctivae normal.      Pupils: Pupils are equal, round, and reactive to light.   Neck:      Trachea: Trachea and phonation normal.   Cardiovascular:      Rate and Rhythm: Normal rate and regular rhythm.      Pulses: Normal pulses.      Heart sounds: Normal heart sounds.   Pulmonary:      Effort: Pulmonary effort is normal.      Breath sounds: Normal breath sounds.   Abdominal:      General: Abdomen is flat. Bowel sounds are normal.      Palpations: Abdomen is soft.   Genitourinary:     Rectum: Normal.   Musculoskeletal:      Cervical back: Full passive range of motion without pain, normal range of motion and neck supple.      Left knee: Swelling and crepitus present. " Decreased range of motion. Tenderness present.        Legs:    Lymphadenopathy:      Cervical: No cervical adenopathy.   Skin:     General: Skin is warm.      Capillary Refill: Capillary refill takes less than 2 seconds.   Neurological:      General: No focal deficit present.      Mental Status: He is alert and oriented to person, place, and time.      Cranial Nerves: Cranial nerves are intact.      Sensory: Sensation is intact.      Motor: Motor function is intact.      Coordination: Coordination is intact.      Gait: Gait is intact.      Deep Tendon Reflexes: Reflexes are normal and symmetric.   Psychiatric:         Attention and Perception: Attention and perception normal.         Mood and Affect: Mood and affect normal.         Speech: Speech normal.         Behavior: Behavior normal.         Thought Content: Thought content normal.         Cognition and Memory: Cognition and memory normal.         Judgment: Judgment normal.        Result Review :   The following data was reviewed by: Vasiliy Santos MD on 01/03/2022:  Common labs    Common Labsle 11/22/21 12/20/21 12/20/21 12/20/21     1529 1529 1529   Glucose    74   BUN    12   Creatinine    0.82   eGFR Non African Am    104   Sodium    138   Potassium    4.2   Chloride    100   Calcium    8.4 (A)   Albumin    4.33   Total Bilirubin    0.3   Alkaline Phosphatase    126 (A)   AST (SGOT)    21   ALT (SGPT)    17   WBC 15.49 (A) 22.33 (A)     Hemoglobin 14.3 14.6     Hematocrit 44.3 45.1     Platelets 364 389     Total Cholesterol   162    Triglycerides   132    HDL Cholesterol   44    LDL Cholesterol    95    (A) Abnormal value            CMP    CMP 12/20/21   Glucose 74   BUN 12   Creatinine 0.82   eGFR Non African Am 104   Sodium 138   Potassium 4.2   Chloride 100   Calcium 8.4 (A)   Albumin 4.33   Total Bilirubin 0.3   Alkaline Phosphatase 126 (A)   AST (SGOT) 21   ALT (SGPT) 17   (A) Abnormal value            CBC w/diff    CBC w/Diff 11/22/21 12/20/21    WBC 15.49 (A) 22.33 (A)   RBC 4.36 4.43   Hemoglobin 14.3 14.6   Hematocrit 44.3 45.1   .6 (A) 101.8 (A)   MCH 32.8 33.0   MCHC 32.3 32.4   RDW 14.2 13.4   Platelets 364 389   Neutrophil Rel % 72.9 80.2 (A)   Immature Granulocyte Rel % 0.3 0.4   Lymphocyte Rel % 17.2 (A) 11.0 (A)   Monocyte Rel % 6.7 6.5   Eosinophil Rel % 2.4 1.4   Basophil Rel % 0.5 0.5   (A) Abnormal value            Lipid Panel    Lipid Panel 12/20/21   Total Cholesterol 162   Triglycerides 132   HDL Cholesterol 44   VLDL Cholesterol 23   LDL Cholesterol  95   LDL/HDL Ratio 2.08           TSH    TSH 12/20/21   TSH 3.970               UA    Urinalysis 12/20/21   Ketones, UA Negative   Leukocytes, UA Negative           Data reviewed: Radiologic studies 1/3/2022 L knee /CXR   LEFT KNEE/ CXR  X-ray was performed this visit.  Indication: LEFT KNEE PAIN/ HYPERTENSION  Interpretation/Findings: SOFT TISSUE SWELLING NOTED ON LEFT KNEE/ NO CARDIOPULMONARY DISEASE NOTED IN THE LUNGS  No comparison or previous X-ray was looked at today.   EXAM:    XR Left Knee, 1 or 2 Views     EXAM DATE:    1/3/2022 2:59 PM     CLINICAL HISTORY:    left knee pain; M25.562-Pain in left knee     TECHNIQUE:    Frontal and/or lateral views of the left knee.     COMPARISON:    No relevant prior studies available.     FINDINGS:    BONES/JOINTS:  Unremarkable.  No acute fracture.  No dislocation.    SOFT TISSUES:  Prepatellar soft tissue swelling. Tiny knee joint  effusion.     IMPRESSION:    Prepatellar soft tissue swelling. Tiny knee joint effusion.     This report was finalized on 1/3/2022 3:07 PM by Dr. Eric Valerio MD.    EXAM:    XR Chest, 2 Views     EXAM DATE:    1/3/2022 2:52 PM     CLINICAL HISTORY:    hypertension; I10-Essential (primary) hypertension     TECHNIQUE:    Frontal and lateral views of the chest.     COMPARISON:    No relevant prior studies available.     FINDINGS:    LUNGS:  Unremarkable.  No consolidation.    PLEURAL SPACE:  Unremarkable.  No  pneumothorax.    HEART:  Unremarkable.  No cardiomegaly.    MEDIASTINUM:  Unremarkable.    BONES/JOINTS:  Unremarkable.     IMPRESSION:    No acute findings in the chest.     This report was finalized on 1/3/2022 3:05 PM by Dr. Eric Valerio MD.       ECG 12 Lead    Date/Time: 1/3/2022 3:27 PM  Performed by: Vasiliy Santos MD  Authorized by: Vasiliy Santos MD   Comparison: not compared with previous ECG   Previous ECG: no previous ECG available  Rhythm: sinus rhythm  Rate: normal  BPM: 85  Conduction: conduction normal  Other findings: non-specific ST-T wave changes    Clinical impression: non-specific ECG              Assessment and Plan    Diagnoses and all orders for this visit:    1. Primary hypertension (Primary)  Assessment & Plan:  Hypertension is worsening.  Dietary sodium restriction.  Weight loss.  Regular aerobic exercise.  Stop smoking.  Medication changes per orders.  Blood pressure will be reassessed in 2 weeks.    Orders:  -     XR Chest PA & Lateral  -     ECG 12 Lead    2. Generalized anxiety disorder  Assessment & Plan:  Psychological condition is improving with treatment.  Continue current treatment regimen.  Psychological condition  will be reassessed in 3 months.      3. Acute pain of left knee  -     XR Knee 1 or 2 View Left (In Office)    4. Fall, initial encounter    Other orders  -     lisinopril (PRINIVIL,ZESTRIL) 10 MG tablet; Take 1 tablet by mouth Daily With Breakfast for 30 days.  Dispense: 30 tablet; Refill: 0    I spent 20 minutes caring for Tobias on this date of service. This time includes time spent by me in the following activities:reviewing tests, performing a medically appropriate examination and/or evaluation , counseling and educating the patient/family/caregiver, ordering medications, tests, or procedures and documenting information in the medical record     Follow Up   Return in about 2 weeks (around 1/17/2022) for Recheck-- BP started new med.  Patient was  given instructions and counseling regarding his condition or for health maintenance advice. Please see specific information pulled into the AVS if appropriate.         This document has been electronically signed by Vasiliy Santos MD  January 3, 2022 15:28 EST

## 2022-01-12 RX ORDER — LEVOFLOXACIN 250 MG/1
250 TABLET ORAL DAILY
Qty: 10 TABLET | Refills: 0 | Status: SHIPPED | OUTPATIENT
Start: 2022-01-12 | End: 2022-01-22

## 2022-01-17 ENCOUNTER — LAB (OUTPATIENT)
Dept: LAB | Facility: HOSPITAL | Age: 42
End: 2022-01-17

## 2022-01-17 ENCOUNTER — CONSULT (OUTPATIENT)
Dept: ONCOLOGY | Facility: CLINIC | Age: 42
End: 2022-01-17

## 2022-01-17 VITALS
RESPIRATION RATE: 18 BRPM | HEART RATE: 94 BPM | DIASTOLIC BLOOD PRESSURE: 68 MMHG | BODY MASS INDEX: 21.18 KG/M2 | HEIGHT: 69 IN | OXYGEN SATURATION: 99 % | TEMPERATURE: 98.2 F | WEIGHT: 143 LBS | SYSTOLIC BLOOD PRESSURE: 139 MMHG

## 2022-01-17 DIAGNOSIS — D72.9 NEUTROPHILIA: Primary | ICD-10-CM

## 2022-01-17 PROBLEM — D72.828 NEUTROPHILIA: Status: ACTIVE | Noted: 2022-01-17

## 2022-01-17 LAB
CHROMATIN AB SERPL-ACNC: <10 IU/ML (ref 0–14)
CRP SERPL-MCNC: 1.48 MG/DL (ref 0–0.5)
ERYTHROCYTE [DISTWIDTH] IN BLOOD BY AUTOMATED COUNT: 13.5 % (ref 12.3–15.4)
ERYTHROCYTE [SEDIMENTATION RATE] IN BLOOD: 21 MM/HR (ref 0–15)
FERRITIN SERPL-MCNC: 183.6 NG/ML (ref 30–400)
FOLATE SERPL-MCNC: 5.61 NG/ML (ref 4.78–24.2)
HAV IGM SERPL QL IA: NORMAL
HBV CORE IGM SERPL QL IA: NORMAL
HBV SURFACE AG SERPL QL IA: NORMAL
HCT VFR BLD AUTO: 44.5 % (ref 37.5–51)
HCV AB SER DONR QL: NORMAL
HGB BLD-MCNC: 14.9 G/DL (ref 13–17.7)
HIV1+2 AB SER QL: NORMAL
IRON 24H UR-MRATE: 88 MCG/DL (ref 59–158)
IRON SATN MFR SERPL: 24 % (ref 20–50)
LDH SERPL-CCNC: 206 U/L (ref 135–225)
LYMPHOCYTES # BLD AUTO: 2.6 10*3/MM3 (ref 0.7–3.1)
LYMPHOCYTES NFR BLD AUTO: 20.4 % (ref 19.6–45.3)
MCH RBC QN AUTO: 33.6 PG (ref 26.6–33)
MCHC RBC AUTO-ENTMCNC: 33.5 G/DL (ref 31.5–35.7)
MCV RBC AUTO: 100.1 FL (ref 79–97)
MONOCYTES # BLD AUTO: 0.5 10*3/MM3 (ref 0.1–0.9)
MONOCYTES NFR BLD AUTO: 3.6 % (ref 5–12)
NEUTROPHILS NFR BLD AUTO: 76 % (ref 42.7–76)
NEUTROPHILS NFR BLD AUTO: 9.6 10*3/MM3 (ref 1.7–7)
PLATELET # BLD AUTO: 423 10*3/MM3 (ref 140–450)
PMV BLD AUTO: 8 FL (ref 6–12)
RBC # BLD AUTO: 4.45 10*6/MM3 (ref 4.14–5.8)
RETICS # AUTO: 0.05 10*6/MM3 (ref 0.02–0.13)
RETICS/RBC NFR AUTO: 1.23 % (ref 0.7–1.9)
TIBC SERPL-MCNC: 371 MCG/DL (ref 298–536)
TRANSFERRIN SERPL-MCNC: 249 MG/DL (ref 200–360)
TSH SERPL DL<=0.05 MIU/L-ACNC: 2.38 UIU/ML (ref 0.27–4.2)
VIT B12 BLD-MCNC: 1812 PG/ML (ref 211–946)
WBC NRBC COR # BLD: 12.6 10*3/MM3 (ref 3.4–10.8)

## 2022-01-17 PROCEDURE — 81206 BCR/ABL1 GENE MAJOR BP: CPT

## 2022-01-17 PROCEDURE — 83540 ASSAY OF IRON: CPT | Performed by: INTERNAL MEDICINE

## 2022-01-17 PROCEDURE — 83615 LACTATE (LD) (LDH) ENZYME: CPT | Performed by: INTERNAL MEDICINE

## 2022-01-17 PROCEDURE — 85045 AUTOMATED RETICULOCYTE COUNT: CPT | Performed by: INTERNAL MEDICINE

## 2022-01-17 PROCEDURE — 84443 ASSAY THYROID STIM HORMONE: CPT | Performed by: INTERNAL MEDICINE

## 2022-01-17 PROCEDURE — 86431 RHEUMATOID FACTOR QUANT: CPT | Performed by: INTERNAL MEDICINE

## 2022-01-17 PROCEDURE — 82607 VITAMIN B-12: CPT | Performed by: INTERNAL MEDICINE

## 2022-01-17 PROCEDURE — 84466 ASSAY OF TRANSFERRIN: CPT | Performed by: INTERNAL MEDICINE

## 2022-01-17 PROCEDURE — 81270 JAK2 GENE: CPT | Performed by: INTERNAL MEDICINE

## 2022-01-17 PROCEDURE — 86140 C-REACTIVE PROTEIN: CPT | Performed by: INTERNAL MEDICINE

## 2022-01-17 PROCEDURE — 81207 BCR/ABL1 GENE MINOR BP: CPT

## 2022-01-17 PROCEDURE — G0432 EIA HIV-1/HIV-2 SCREEN: HCPCS | Performed by: INTERNAL MEDICINE

## 2022-01-17 PROCEDURE — 85025 COMPLETE CBC W/AUTO DIFF WBC: CPT | Performed by: INTERNAL MEDICINE

## 2022-01-17 PROCEDURE — 82746 ASSAY OF FOLIC ACID SERUM: CPT | Performed by: INTERNAL MEDICINE

## 2022-01-17 PROCEDURE — 99204 OFFICE O/P NEW MOD 45 MIN: CPT | Performed by: INTERNAL MEDICINE

## 2022-01-17 PROCEDURE — 80074 ACUTE HEPATITIS PANEL: CPT | Performed by: INTERNAL MEDICINE

## 2022-01-17 PROCEDURE — 85652 RBC SED RATE AUTOMATED: CPT | Performed by: INTERNAL MEDICINE

## 2022-01-17 PROCEDURE — 86038 ANTINUCLEAR ANTIBODIES: CPT | Performed by: INTERNAL MEDICINE

## 2022-01-17 PROCEDURE — 36415 COLL VENOUS BLD VENIPUNCTURE: CPT | Performed by: INTERNAL MEDICINE

## 2022-01-17 PROCEDURE — 82728 ASSAY OF FERRITIN: CPT | Performed by: INTERNAL MEDICINE

## 2022-01-17 NOTE — PROGRESS NOTES
DATE OF CONSULTATION: 1/17/2022    REFERRING PHYSICIAN: Vasiliy Santos MD    Dear Vasiliy Chahal MD  Thank you for asking for my medical advice on this patient. I saw him in the  [place] office on 1/17/2022    REASON FOR CONSULTATION: Neutrophilia    HISTORY OF PRESENT ILLNESS: The patient is a very pleasant 41 y.o.  male   who was in his usual state of health until November 2021.  Patient presented for routine follow-up visit with his primary care provider.  He had blood work done that showed leukocytosis.  This was repeated a month ago that revealed progressive leukocytosis with mainly neutrophilia.  The patient was referred to me for further recommendations.    SUBJECTIVE: When I saw the patient today he is here with his wife he is complaining of night sweats as well as low-grade temperature.  He has lost 20 pounds over he gained 12 back.  Denies any previous diagnosis of cancer.  He was told once before that his white cells were elevated 30,000 that was 3 years ago at that point he went to the emergency room after dizziness spell.  He never had any follow-up since then until November 2021.    Review of Systems   Constitutional: Negative for activity change, appetite change, chills, fatigue, fever and unexpected weight change.   HENT: Negative for hearing loss, mouth sores, nosebleeds, sore throat and trouble swallowing.    Eyes: Negative for visual disturbance.   Respiratory: Negative for cough, chest tightness, shortness of breath and wheezing.    Cardiovascular: Negative for chest pain, palpitations and leg swelling.   Gastrointestinal: Negative for abdominal distention, abdominal pain, blood in stool, constipation, diarrhea, nausea, rectal pain and vomiting.   Endocrine: Negative for cold intolerance and heat intolerance.   Genitourinary: Negative for difficulty urinating, dysuria, frequency and urgency.   Musculoskeletal: Negative for arthralgias, back pain, gait problem, joint swelling  "and myalgias.   Skin: Negative for rash.   Neurological: Negative for dizziness, tremors, syncope, weakness, light-headedness, numbness and headaches.   Hematological: Negative for adenopathy. Does not bruise/bleed easily.   Psychiatric/Behavioral: Negative for confusion, sleep disturbance and suicidal ideas. The patient is not nervous/anxious.        Past Medical History:   Diagnosis Date   • Anxiety    • Hypertension        Social History     Socioeconomic History   • Marital status: Single   Tobacco Use   • Smoking status: Current Every Day Smoker     Packs/day: 1.50     Years: 23.00     Pack years: 34.50     Start date: 8/21/1998   • Smokeless tobacco: Never Used   Vaping Use   • Vaping Use: Never used   Substance and Sexual Activity   • Alcohol use: Not Currently   • Drug use: Not Currently       Family History   Problem Relation Age of Onset   • COPD Mother    • Diabetes Father    • Hypertension Father    • Heart disease Father        Past Surgical History:   Procedure Laterality Date   • ANKLE STABILIZATION  2003   • REPLACEMENT TOTAL KNEE  2015    left side -        Allergies   Allergen Reactions   • Amoxicillin Other (See Comments)     States unknown-happened when a small child          Current Outpatient Medications:   •  busPIRone (BUSPAR) 7.5 MG tablet, TAKE 1 TABLET BY MOUTH TWICE DAILY, Disp: 60 tablet, Rfl: 0  •  cetirizine (zyrTEC) 10 MG tablet, Take 1 tablet by mouth Daily., Disp: 30 tablet, Rfl: 2  •  diclofenac (VOLTAREN) 50 MG EC tablet, Take 1 tablet by mouth 2 (Two) Times a Day With Meals for 30 days., Disp: 60 tablet, Rfl: 0  •  levoFLOXacin (Levaquin) 250 MG tablet, Take 1 tablet by mouth Daily for 10 days., Disp: 10 tablet, Rfl: 0  •  lisinopril (PRINIVIL,ZESTRIL) 10 MG tablet, Take 1 tablet by mouth Daily With Breakfast for 30 days., Disp: 30 tablet, Rfl: 0    PHYSICAL EXAMINATION:   /68   Pulse 94   Temp 98.2 °F (36.8 °C) (Temporal)   Resp 18   Ht 175.3 cm (69.02\")   Wt 64.9 kg " (143 lb)   SpO2 99%   BMI 21.11 kg/m²   Pain Score    01/17/22 1323   PainSc: 0-No pain      ECOG score: 0            ECOG Performance Status: 1 - Symptomatic but completely ambulatory  General Appearance:  alert, cooperative, no apparent distress and appears stated age   Neurologic/Psychiatric: A&O x 3, gait steady, appropriate affect, strength 5/5 in all muscle groups   HEENT:  Normocephalic, without obvious abnormality, mucous membranes moist   Neck: Supple, symmetrical, trachea midline, no adenopathy;  No thyromegaly, masses, or tenderness   Lungs:   Clear to auscultation bilaterally; respirations regular, even, and unlabored bilaterally   Heart:  Regular rate and rhythm, no murmurs appreciated   Abdomen:   Soft, non-tender, non-distended and no organomegaly   Lymph nodes: No cervical, supraclavicular, inguinal or axillary adenopathy noted   Extremities: Normal, atraumatic; no clubbing, cyanosis, or edema    Skin: No rashes, ulcers, or suspicious lesions noted       No visits with results within 2 Week(s) from this visit.   Latest known visit with results is:   Office Visit on 12/20/2021   Component Date Value Ref Range Status   • Glucose 12/20/2021 74  65 - 99 mg/dL Final   • BUN 12/20/2021 12  6 - 20 mg/dL Final   • Creatinine 12/20/2021 0.82  0.76 - 1.27 mg/dL Final   • Sodium 12/20/2021 138  136 - 145 mmol/L Final   • Potassium 12/20/2021 4.2  3.5 - 5.2 mmol/L Final   • Chloride 12/20/2021 100  98 - 107 mmol/L Final   • CO2 12/20/2021 25.1  22.0 - 29.0 mmol/L Final   • Calcium 12/20/2021 8.4* 8.6 - 10.5 mg/dL Final   • Total Protein 12/20/2021 6.8  6.0 - 8.5 g/dL Final   • Albumin 12/20/2021 4.33  3.50 - 5.20 g/dL Final   • ALT (SGPT) 12/20/2021 17  1 - 41 U/L Final   • AST (SGOT) 12/20/2021 21  1 - 40 U/L Final   • Alkaline Phosphatase 12/20/2021 126* 39 - 117 U/L Final   • Total Bilirubin 12/20/2021 0.3  0.0 - 1.2 mg/dL Final   • eGFR Non  Amer 12/20/2021 104  >60 mL/min/1.73 Final   • Globulin  12/20/2021 2.5  gm/dL Final   • A/G Ratio 12/20/2021 1.8  g/dL Final   • BUN/Creatinine Ratio 12/20/2021 14.6  7.0 - 25.0 Final   • Anion Gap 12/20/2021 12.9  5.0 - 15.0 mmol/L Final   • Total Cholesterol 12/20/2021 162  0 - 200 mg/dL Final   • Triglycerides 12/20/2021 132  0 - 150 mg/dL Final   • HDL Cholesterol 12/20/2021 44  40 - 60 mg/dL Final   • LDL Cholesterol  12/20/2021 95  0 - 100 mg/dL Final   • VLDL Cholesterol 12/20/2021 23  5 - 40 mg/dL Final   • LDL/HDL Ratio 12/20/2021 2.08   Final   • TSH 12/20/2021 3.970  0.270 - 4.200 uIU/mL Final   • 25 Hydroxy, Vitamin D 12/20/2021 16.0* 30.0 - 100.0 ng/ml Final   • Color 12/20/2021 Veena  Yellow, Straw, Dark Yellow, Veena Final   • Clarity, UA 12/20/2021 Clear  Clear Final   • Glucose, UA 12/20/2021 Negative  Negative, 1000 mg/dL (3+) mg/dL Final   • Bilirubin 12/20/2021 Small (1+)* Negative Final   • Ketones, UA 12/20/2021 Negative  Negative Final   • Specific Gravity  12/20/2021 1.025  1.005 - 1.030 Final   • Blood, UA 12/20/2021 Negative  Negative Final   • pH, Urine 12/20/2021 6.0  5.0 - 8.0 Final   • Protein, POC 12/20/2021 Trace* Negative mg/dL Final   • Urobilinogen, UA 12/20/2021 Normal  Normal Final   • Leukocytes 12/20/2021 Negative  Negative Final   • Nitrite, UA 12/20/2021 Negative  Negative Final   • Vitamin B-12 12/20/2021 1,704* 211 - 946 pg/mL Final   • Hepatitis C Ab 12/20/2021 Non-Reactive  Non-Reactive Final   • WBC 12/20/2021 22.33* 3.40 - 10.80 10*3/mm3 Final   • RBC 12/20/2021 4.43  4.14 - 5.80 10*6/mm3 Final   • Hemoglobin 12/20/2021 14.6  13.0 - 17.7 g/dL Final   • Hematocrit 12/20/2021 45.1  37.5 - 51.0 % Final   • MCV 12/20/2021 101.8* 79.0 - 97.0 fL Final   • MCH 12/20/2021 33.0  26.6 - 33.0 pg Final   • MCHC 12/20/2021 32.4  31.5 - 35.7 g/dL Final   • RDW 12/20/2021 13.4  12.3 - 15.4 % Final   • RDW-SD 12/20/2021 51.0  37.0 - 54.0 fl Final   • MPV 12/20/2021 11.1  6.0 - 12.0 fL Final   • Platelets 12/20/2021 389  140 - 450 10*3/mm3  Final   • Neutrophil % 12/20/2021 80.2* 42.7 - 76.0 % Final   • Lymphocyte % 12/20/2021 11.0* 19.6 - 45.3 % Final   • Monocyte % 12/20/2021 6.5  5.0 - 12.0 % Final   • Eosinophil % 12/20/2021 1.4  0.3 - 6.2 % Final   • Basophil % 12/20/2021 0.5  0.0 - 1.5 % Final   • Immature Grans % 12/20/2021 0.4  0.0 - 0.5 % Final   • Neutrophils, Absolute 12/20/2021 17.91* 1.70 - 7.00 10*3/mm3 Final   • Lymphocytes, Absolute 12/20/2021 2.45  0.70 - 3.10 10*3/mm3 Final   • Monocytes, Absolute 12/20/2021 1.45* 0.10 - 0.90 10*3/mm3 Final   • Eosinophils, Absolute 12/20/2021 0.32  0.00 - 0.40 10*3/mm3 Final   • Basophils, Absolute 12/20/2021 0.12  0.00 - 0.20 10*3/mm3 Final   • Immature Grans, Absolute 12/20/2021 0.08* 0.00 - 0.05 10*3/mm3 Final   • nRBC 12/20/2021 0.0  0.0 - 0.2 /100 WBC Final        XR Wrist 3+ View Right    Result Date: 12/20/2021  Narrative: EXAMINATION: XR WRIST 3+ VW RIGHT-  CLINICAL INDICATION: pain of wrist; R20.0-Anesthesia of skin; R20.2-Paresthesia of skin   COMPARISON: None available  FINDINGS: 4 views of the right wrist, 1 minute and ulnar deviation view  No fracture or dislocation  Cystic change in the scaphoid.      Impression: 1. Cystic change in the scaphoid but no acute fracture  This report was finalized on 12/20/2021 3:37 PM by Dr. Domingo Hawkins MD.      XR Hand 3+ View Right    Result Date: 12/20/2021  Narrative: EXAMINATION: XR HAND 3+ VW RIGHT-  CLINICAL INDICATION: right wrist pain; R20.0-Anesthesia of skin; R20.2-Paresthesia of skin   COMPARISON: None available  FINDINGS: 3 views of the right hand show no evidence of an acute fracture or dislocation. There are no blastic or lytic lesions.      Impression: Evidence of an old right fifth metacarpal fracture, but no acute fracture is seen  This report was finalized on 12/20/2021 3:21 PM by Dr. Domingo Hawkins MD.      XR Knee 1 or 2 View Left (In Office)    Result Date: 1/3/2022  Narrative: EXAM:   XR Left Knee, 1 or 2 Views  EXAM DATE:    1/3/2022 2:59 PM  CLINICAL HISTORY:   left knee pain; M25.562-Pain in left knee  TECHNIQUE:   Frontal and/or lateral views of the left knee.  COMPARISON:   No relevant prior studies available.  FINDINGS:   BONES/JOINTS:  Unremarkable.  No acute fracture.  No dislocation.   SOFT TISSUES:  Prepatellar soft tissue swelling. Tiny knee joint effusion.      Impression:   Prepatellar soft tissue swelling. Tiny knee joint effusion.  This report was finalized on 1/3/2022 3:07 PM by Dr. Eric Valerio MD.      XR Chest PA & Lateral    Result Date: 1/3/2022  Narrative: EXAM:   XR Chest, 2 Views  EXAM DATE:   1/3/2022 2:52 PM  CLINICAL HISTORY:   hypertension; I10-Essential (primary) hypertension  TECHNIQUE:   Frontal and lateral views of the chest.  COMPARISON:   No relevant prior studies available.  FINDINGS:   LUNGS:  Unremarkable.  No consolidation.   PLEURAL SPACE:  Unremarkable.  No pneumothorax.   HEART:  Unremarkable.  No cardiomegaly.   MEDIASTINUM:  Unremarkable.   BONES/JOINTS:  Unremarkable.      Impression:   No acute findings in the chest.  This report was finalized on 1/3/2022 3:05 PM by Dr. Eric Valerio MD.          DIAGNOSTIC DATA:   1. Radiology: As above  2. Dr. Santos's note reviewed by me and documented in the  chart.   3. Pathology report: None available  4. Laboratory data: As above    ASSESSMENT: The patient is a very pleasant 41 y.o.  male  with leukocytosis with neutrophilia    PROBLEM LIST:   1.  Leukocytosis neutrophilia:  A.  First observed November 22, 2021 and progressed after repeating test December 2021  2.  Macrocytosis  3.  Tobacco abuse  4.  Hypertension    PLAN:   1. I had a long discussion today with the patient and his wife about his  new diagnosis of leukocytosis. I reviewed the patient's documents including refereing provider's notes, lab results, imaging, and path report.   2.  I will do further work-up today.  I will check for autoimmune markers including CHIKI and RF, inflammatory  markers including ESR and CRP, infections including HIV and hepatitis profile, vitamin levels, myeloproliferative work-up including BCR ABL and JAK2 mutation and I will repeat his CBC.  3.  The patient will follow-up with me in 2 weeks to go over the results.  If still no explanation we will consider doing CT scans as well as bone marrow biopsy.  4.  The patient was advised to cut back on smoking.  Currently smokes 2 pack/day.    Jassi Rangel MD  1/17/2022

## 2022-01-18 LAB
ANA HOMOGEN TITR SER: ABNORMAL {TITER}
ANA TITR SER IF: POSITIVE {TITER}
Lab: ABNORMAL

## 2022-01-21 LAB
JAK2 P.V617F BLD/T QL: NORMAL
LAB DIRECTOR NAME PROVIDER: NORMAL
LABORATORY COMMENT REPORT: NORMAL

## 2022-01-24 ENCOUNTER — OFFICE VISIT (OUTPATIENT)
Dept: FAMILY MEDICINE CLINIC | Facility: CLINIC | Age: 42
End: 2022-01-24

## 2022-01-24 VITALS
WEIGHT: 144.8 LBS | BODY MASS INDEX: 21.45 KG/M2 | DIASTOLIC BLOOD PRESSURE: 70 MMHG | HEIGHT: 69 IN | OXYGEN SATURATION: 98 % | TEMPERATURE: 96 F | HEART RATE: 107 BPM | SYSTOLIC BLOOD PRESSURE: 142 MMHG | RESPIRATION RATE: 18 BRPM

## 2022-01-24 DIAGNOSIS — F41.9 ANXIETY: ICD-10-CM

## 2022-01-24 DIAGNOSIS — F17.210 CIGARETTE NICOTINE DEPENDENCE WITHOUT COMPLICATION: ICD-10-CM

## 2022-01-24 DIAGNOSIS — I10 PRIMARY HYPERTENSION: Primary | ICD-10-CM

## 2022-01-24 DIAGNOSIS — J30.2 SEASONAL ALLERGIES: ICD-10-CM

## 2022-01-24 PROCEDURE — 99213 OFFICE O/P EST LOW 20 MIN: CPT | Performed by: PSYCHOLOGIST

## 2022-01-24 RX ORDER — CETIRIZINE HYDROCHLORIDE 10 MG/1
10 TABLET ORAL DAILY
Qty: 30 TABLET | Refills: 2 | Status: SHIPPED | OUTPATIENT
Start: 2022-01-24 | End: 2022-08-15

## 2022-01-24 RX ORDER — LISINOPRIL 10 MG/1
10 TABLET ORAL
Qty: 30 TABLET | Refills: 2 | Status: SHIPPED | OUTPATIENT
Start: 2022-01-24 | End: 2022-08-15

## 2022-01-24 RX ORDER — BUSPIRONE HYDROCHLORIDE 7.5 MG/1
7.5 TABLET ORAL 2 TIMES DAILY
Qty: 60 TABLET | Refills: 2 | Status: SHIPPED | OUTPATIENT
Start: 2022-01-24 | End: 2022-11-08

## 2022-01-24 NOTE — PROGRESS NOTES
"Chief Complaint  Follow-up (R/T HTN) and Fever (pt reports fever X 2 wks)    Subjective          Tobias Mariscal presents to [facility] c/o follow up chronic illness 2. Referral for leukocytosis / persistent fever  Fever   This is a chronic problem. The current episode started 1 to 4 weeks ago. The problem occurs daily. The problem has been waxing and waning. The maximum temperature noted was 102 to 102.9 F. The temperature was taken using a tympanic thermometer. Pertinent negatives include no abdominal pain, chest pain, coughing, nausea or vomiting. He has tried acetaminophen for the symptoms. The treatment provided mild relief.   Hypertension  The problem has been resolved since onset. The problem is controlled. Pertinent negatives include no chest pain, palpitations or shortness of breath. Risk factors for coronary artery disease include smoking/tobacco exposure. Past treatments include lifestyle changes and ACE inhibitors. Current antihypertension treatment includes lifestyle changes and ACE inhibitors. The current treatment provides mild improvement. There are no compliance problems.  There is no history of angina, kidney disease or CAD/MI.   Nicotine Dependence  Presents for initial visit. Preferred tobacco types include cigarettes. Preferred cigarette types include filtered. Preferred strength is regular. Preferred cigarettes are menthol. Preferred brands include Basic. His urge triggers include company of smokers, driving, meal time and stress. His first smoke is before 6 AM. He smokes 2 packs of cigarettes per day. He started smoking when he was >18 years old. Tobias is thinking about quitting. Tobias has tried to quit 1 time. There is no history of alcohol abuse and drug use.     Objective   Vital Signs:   /70 (BP Location: Right arm, Patient Position: Sitting, Cuff Size: Adult)   Pulse 107   Temp 96 °F (35.6 °C) (Temporal)   Resp 18   Ht 175.3 cm (69\")   Wt 65.7 kg " (144 lb 12.8 oz)   SpO2 98%   BMI 21.38 kg/m²     Physical Exam  Vitals and nursing note reviewed.   Constitutional:       General: He is awake.      Appearance: Normal appearance. He is well-developed, well-groomed and normal weight.   HENT:      Head: Normocephalic and atraumatic.      Jaw: There is normal jaw occlusion.      Nose: Nose normal.      Mouth/Throat:      Mouth: Mucous membranes are moist.      Pharynx: Oropharynx is clear.   Eyes:      General: Lids are normal. Vision grossly intact. Gaze aligned appropriately.      Extraocular Movements: Extraocular movements intact.      Conjunctiva/sclera: Conjunctivae normal.      Pupils: Pupils are equal, round, and reactive to light.   Neck:      Trachea: Trachea and phonation normal.   Cardiovascular:      Rate and Rhythm: Normal rate and regular rhythm.      Pulses: Normal pulses.      Heart sounds: Normal heart sounds.   Pulmonary:      Effort: Pulmonary effort is normal.      Breath sounds: Normal breath sounds.   Abdominal:      General: Abdomen is flat. Bowel sounds are normal.      Palpations: Abdomen is soft.   Genitourinary:     Rectum: Normal.   Musculoskeletal:         General: Normal range of motion.      Cervical back: Full passive range of motion without pain, normal range of motion and neck supple.   Lymphadenopathy:      Cervical: No cervical adenopathy.   Skin:     General: Skin is warm.      Capillary Refill: Capillary refill takes less than 2 seconds.   Neurological:      General: No focal deficit present.      Mental Status: He is alert and oriented to person, place, and time.      Cranial Nerves: Cranial nerves are intact.      Sensory: Sensation is intact.      Motor: Motor function is intact.      Coordination: Coordination is intact.      Gait: Gait is intact.      Deep Tendon Reflexes: Reflexes are normal and symmetric.   Psychiatric:         Attention and Perception: Attention and perception normal.         Mood and Affect: Mood and  affect normal.         Speech: Speech normal.         Behavior: Behavior normal.         Thought Content: Thought content normal.         Cognition and Memory: Cognition and memory normal.         Judgment: Judgment normal.        Result Review :   The following data was reviewed by: Vasiliy Santos MD on 01/24/2022:  Common labs    Common Labsle 11/22/21 12/20/21 12/20/21 12/20/21 1/17/22     1529 1529 1529    Glucose    74    BUN    12    Creatinine    0.82    eGFR Non African Am    104    Sodium    138    Potassium    4.2    Chloride    100    Calcium    8.4 (A)    Albumin    4.33    Total Bilirubin    0.3    Alkaline Phosphatase    126 (A)    AST (SGOT)    21    ALT (SGPT)    17    WBC 15.49 (A) 22.33 (A)   12.60 (A)   Hemoglobin 14.3 14.6   14.9   Hematocrit 44.3 45.1   44.5   Platelets 364 389   423   Total Cholesterol   162     Triglycerides   132     HDL Cholesterol   44     LDL Cholesterol    95     (A) Abnormal value            Data reviewed: Radiologic studies 1/3/2022 XR Knee          Assessment and Plan    Diagnoses and all orders for this visit:    1. Primary hypertension (Primary)  Assessment & Plan:  Hypertension is improving with treatment.  Continue current treatment regimen.  Dietary sodium restriction.  Stop smoking.  Blood pressure will be reassessed in 3 months.      2. Anxiety    3. Seasonal allergies    I spent 22 minutes caring for Tobias on this date of service. This time includes time spent by me in the following activities:reviewing tests, performing a medically appropriate examination and/or evaluation , counseling and educating the patient/family/caregiver, ordering medications, tests, or procedures and documenting information in the medical record     Follow Up   No follow-ups on file.  Patient was given instructions and counseling regarding his condition or for health maintenance advice. Please see specific information pulled into the AVS if appropriate.         This document  has been electronically signed by Vasiliy Santos MD  January 24, 2022 14:17 EST

## 2022-01-27 LAB
INTERPRETATION: NEGATIVE
LAB DIRECTOR NAME PROVIDER: NORMAL
LABORATORY COMMENT REPORT: NORMAL
REF LAB TEST METHOD: NORMAL
T(ABL1,BCR)B2A2/CONTROL BLD/T: NORMAL %
T(ABL1,BCR)B3A2/CONTROL BLD/T: NORMAL %
T(ABL1,BCR)E1A2/CONTROL BLD/T: NORMAL %

## 2022-01-31 ENCOUNTER — OFFICE VISIT (OUTPATIENT)
Dept: ONCOLOGY | Facility: CLINIC | Age: 42
End: 2022-01-31

## 2022-01-31 VITALS
DIASTOLIC BLOOD PRESSURE: 71 MMHG | WEIGHT: 141 LBS | HEIGHT: 69 IN | OXYGEN SATURATION: 99 % | BODY MASS INDEX: 20.88 KG/M2 | HEART RATE: 85 BPM | SYSTOLIC BLOOD PRESSURE: 146 MMHG | TEMPERATURE: 96.9 F | RESPIRATION RATE: 18 BRPM

## 2022-01-31 DIAGNOSIS — R04.2 HEMOPTYSIS: ICD-10-CM

## 2022-01-31 DIAGNOSIS — D72.9 NEUTROPHILIA: Primary | ICD-10-CM

## 2022-01-31 PROCEDURE — 99214 OFFICE O/P EST MOD 30 MIN: CPT | Performed by: INTERNAL MEDICINE

## 2022-01-31 RX ORDER — HYDROXYZINE HYDROCHLORIDE 25 MG/1
TABLET, FILM COATED ORAL
COMMUNITY
Start: 2022-01-24 | End: 2022-08-15

## 2022-01-31 NOTE — PROGRESS NOTES
DATE OF VISIT: 1/31/2022    REASON FOR VISIT: Followup for neutrophilia     HISTORY OF PRESENT ILLNESS: The patient is a very pleasant 41 y.o. male  with past medical history significant for neutrophilia diagnosed November 2021. The  patient is here today for scheduled follow-up visit.    SUBJECTIVE: The patient is here today with his wife.  He is complaining of on and off night sweats with no fevers.  He does have on and off cough on and off hemoptysis.    PAST MEDICAL HISTORY/SOCIAL HISTORY/FAMILY HISTORY: Reviewed by me and unchanged from my documentation done on 01/31/22.    Review of Systems   Constitutional: Positive for appetite change, fatigue and unexpected weight change. Negative for activity change, chills and fever.   HENT: Negative for hearing loss, mouth sores, nosebleeds, sore throat and trouble swallowing.    Eyes: Negative for visual disturbance.   Respiratory: Positive for cough. Negative for chest tightness, shortness of breath and wheezing.    Cardiovascular: Negative for chest pain, palpitations and leg swelling.   Gastrointestinal: Negative for abdominal distention, abdominal pain, blood in stool, constipation, diarrhea, nausea, rectal pain and vomiting.   Endocrine: Negative for cold intolerance and heat intolerance.   Genitourinary: Negative for difficulty urinating, dysuria, frequency and urgency.   Musculoskeletal: Negative for arthralgias, back pain, gait problem, joint swelling and myalgias.   Skin: Negative for rash.   Neurological: Negative for dizziness, tremors, syncope, weakness, light-headedness, numbness and headaches.   Hematological: Negative for adenopathy. Does not bruise/bleed easily.   Psychiatric/Behavioral: Negative for confusion, sleep disturbance and suicidal ideas. The patient is not nervous/anxious.          Current Outpatient Medications:   •  busPIRone (BUSPAR) 7.5 MG tablet, Take 1 tablet by mouth 2 (Two) Times a Day for 90 days., Disp: 60 tablet, Rfl: 2  •   "cetirizine (zyrTEC) 10 MG tablet, Take 1 tablet by mouth Daily., Disp: 30 tablet, Rfl: 2  •  hydrOXYzine (ATARAX) 25 MG tablet, , Disp: , Rfl:   •  lisinopril (PRINIVIL,ZESTRIL) 10 MG tablet, Take 1 tablet by mouth Daily With Breakfast for 90 days., Disp: 30 tablet, Rfl: 2    PHYSICAL EXAMINATION:   Ht 175.3 cm (69.02\")   BMI 21.37 kg/m²    There were no vitals filed for this visit.                  ECOG Performance Status: 1 - Symptomatic but completely ambulatory  General Appearance:  alert, cooperative, no apparent distress and appears stated age   Neurologic/Psychiatric: A&O x 3, gait steady, appropriate affect, strength 5/5 in all muscle groups   HEENT:  Normocephalic, without obvious abnormality, mucous membranes moist   Neck: Supple, symmetrical, trachea midline, no adenopathy;  No thyromegaly, masses, or tenderness   Lungs:   Clear to auscultation bilaterally; respirations regular, even, and unlabored bilaterally   Heart:  Regular rate and rhythm, no murmurs appreciated   Abdomen:   Soft, non-tender, non-distended and no organomegaly   Lymph nodes: No cervical, supraclavicular, inguinal or axillary adenopathy noted   Extremities: Normal, atraumatic; no clubbing, cyanosis, or edema    Skin: No rashes, ulcers, or suspicious lesions noted     No visits with results within 2 Week(s) from this visit.   Latest known visit with results is:   Consult on 01/17/2022   Component Date Value Ref Range Status   • Ferritin 01/17/2022 183.60  30.00 - 400.00 ng/mL Final   • Sed Rate 01/17/2022 21* 0 - 15 mm/hr Final   • Folate 01/17/2022 5.61  4.78 - 24.20 ng/mL Final   • Vitamin B-12 01/17/2022 1,812* 211 - 946 pg/mL Final   • TSH 01/17/2022 2.380  0.270 - 4.200 uIU/mL Final   • Iron 01/17/2022 88  59 - 158 mcg/dL Final   • Iron Saturation 01/17/2022 24  20 - 50 % Final   • Transferrin 01/17/2022 249  200 - 360 mg/dL Final   • TIBC 01/17/2022 371  298 - 536 mcg/dL Final   • Reticulocyte % 01/17/2022 1.23  0.70 - 1.90 % " Final   • Reticulocyte Absolute 01/17/2022 0.0544  0.0200 - 0.1300 10*6/mm3 Final   • LDH 01/17/2022 206  135 - 225 U/L Final   • CHIKI 01/17/2022 Positive*  Final                                         Negative   <1:80                                       Borderline  1:80                                       Positive   >1:80   • Hepatitis B Surface Ag 01/17/2022 Non-Reactive  Non-Reactive Final   • Hep A IgM 01/17/2022 Non-Reactive  Non-Reactive Final   • Hep B C IgM 01/17/2022 Non-Reactive  Non-Reactive Final   • Hepatitis C Ab 01/17/2022 Non-Reactive  Non-Reactive Final   • HIV-1/ HIV-2 01/17/2022 Non-Reactive  Non-Reactive Final   • Rheumatoid Factor Quantitative 01/17/2022 <10.0  0.0 - 14.0 IU/mL Final   • e13a2 (b2a2) Transcript 01/17/2022 Comment  % Final               <0.0032 %   • e14a2 (b3a2) Transcript 01/17/2022 Comment  % Final               <0.0032 %   • E1A2 transcript 01/17/2022 Comment  % Final               <0.0032 %   • Interpretation 01/17/2022 Negative   Final    NEGATIVE for the BCR-ABL1 e1a2 (p190), e13a2 (b2a2, p210) and e14a2  (b3a2, p210) fusion transcripts. These results do not rule out the  presence of rare BCR-ABL1 transcripts not detected by this assay.   • Director Review 01/17/2022 Comment   Final    Demario Winslow MD, PhD  Director, Molecular Oncology  [facility] [place] [address]  [phone]   • Background: 01/17/2022 Comment   Final    This assay can detect three different types of BCR-ABL1 fusion  transcripts associated with CML, ALL, and AML: e13a2 (previously  b2a2) and e14a2 (previously b3a2) (major breakpoint, p210), as  well as e1a2 (minor breakpoint, p190). The e13a2 and e14a2 transcript  values are titrated to the current International Scale (IS). The  standardized baseline is 100% BCR-ABL1 (IS) and major molecular  response (MMR) is equivalent to 0.1% BCR-ABL1 (IS) corresponding  to a 3-log reduction. Results  should be correlated with appropriate  clinical and laboratory information as indicated.   • Methodology: 01/17/2022 Comment   Final    Total RNA is isolated from the sample and subject to a real-time,  reverse transcriptase polymerase chain reaction (RT-PCR). The PCR  primers and probes are specific for BCR-ABL1 e13a2, e14a2 and e1a2  fusion transcripts. The ABL1 transcript is amplified as the control  for cDNA quantity and quality. Serial dilutions of a validated  positive control RNA with known t(9;22) BCR-ABL1 are used as  reference for quantification of BCR-ABL1 relative to ABL1. The  numeric BCR-ABL1 level is reported as % BCR-ABL1/ABL1 and the  detection sensitivity is 4.5 log below the standard baseline  (<0.0032%).  This test was developed and its performance characteristics determined  by [employer]. It has not been cleared or approved by the Food and Drug  Administration.   • C-Reactive Protein 01/17/2022 1.48* 0.00 - 0.50 mg/dL Final   • JAK2 V617F Mutation 01/17/2022 Comment   Final    Result: NEGATIVE for the JAK2 V617F mutation.  Interpretation:  The G to T nucleotide change encoding the V617F  mutation was not detected.  This result does not rule out the presence  of the JAK2 mutation at a level below the sensitivity of detection of  this assay, or the presence of other mutations within JAK2 not  detected by this assay.  This result does not rule out a diagnosis of  polycythemia vera, essential thrombocythemia or idiopathic  myelofibrosis as the V617F mutation is not detected in all patients  with these disorders.   • Background: 01/17/2022 Comment   Final    Comment: JAK2 is a cytoplasmic tyrosine kinase with a key role in signal  transduction from multiple hematopoietic growth factor receptors. A  point mutation within exon 14 of the JAK2 gene ([id number]) encoding a  valine to phenylalanine substitution at position 617 of the JAK2  protein (V617F) has been identified in most patients with  polycythemia  vera, and in about half of those with either essential thrombocythemia  or idiopathic myelofibrosis. The V617F has also been detected,  although infrequently, in other myeloid disorders such as chronic  myelomonocytic leukemia and chronic neutrophilic luekemia. V617F is  an acquired mutation that alters a highly conserved valine present in  the negative regulatory JH2 domain of the JAK2 protein and is  predicted to dysregulate kinase activity.  Methodology:  Total genomic DNA was extracted and subjected to TaqMan real-time PCR  amplification/detection. Two amplification products per sample were  monitored by real-time PCR using primers/probes specific                            to JAK2 wild  type (WT) and JAK2 mutant V617F. The [employer]irc[employer] Absolute Quantitation  software will compare the patient specimen valuse to the standard  curves and generate percent values for wild type and mutant type.  In vitro studies have indicated that this assay has an analytical  sensitivity of 1%.  References:  Abhishek EJ, Dougie CARNEY, Raymond PJ, et al. Acquired mutation of the  tyrosine kinase JAK2 in human myeloproliferative disorders. Lancet.  2005 Mar 19-25; 365(8186):0018-9350.  Jose Carlos SCHAEFER, Luiz V, Jennie Banuelos JP. A unique clonal JAK2 mutation leading  to constitutive signaling causes polycythaemia vera. Nature. 2005 Apr 28; 434(5339):2729-6179.  Prakash R, Caty F, Nico AS, et al. A gain-of-function  mutation of JAK2 in myeloproliferative disorders. N Engl J Med. 2005  Apr 28; 352(31):5227-2229.   • Director Review 01/17/2022 Comment   Final    Horacio Mustafa, PhD, [facility]      Director, Molecular Oncology      [facility] [place] [address]      [phone]  This test was developed and its performance characteristics  determined by [facility]. It has not been cleared or approved  by the Food and Drug Administration.   • WBC 01/17/2022 12.60* 3.40 - 10.80 10*3/mm3  Final   • RBC 01/17/2022 4.45  4.14 - 5.80 10*6/mm3 Final   • Hemoglobin 01/17/2022 14.9  13.0 - 17.7 g/dL Final   • Hematocrit 01/17/2022 44.5  37.5 - 51.0 % Final   • RDW 01/17/2022 13.5  12.3 - 15.4 % Final   • MCV 01/17/2022 100.1* 79.0 - 97.0 fL Final   • MCH 01/17/2022 33.6* 26.6 - 33.0 pg Final   • MCHC 01/17/2022 33.5  31.5 - 35.7 g/dL Final   • MPV 01/17/2022 8.0  6.0 - 12.0 fL Final   • Platelets 01/17/2022 423  140 - 450 10*3/mm3 Final   • Neutrophil % 01/17/2022 76.0  42.7 - 76.0 % Final   • Lymphocyte % 01/17/2022 20.4  19.6 - 45.3 % Final   • Monocyte % 01/17/2022 3.6* 5.0 - 12.0 % Final   • Neutrophils, Absolute 01/17/2022 9.60* 1.70 - 7.00 10*3/mm3 Final   • Lymphocytes, Absolute 01/17/2022 2.60  0.70 - 3.10 10*3/mm3 Final   • Monocytes, Absolute 01/17/2022 0.50  0.10 - 0.90 10*3/mm3 Final   • Homogeneous Pattern 01/17/2022 1:160*  Final    ICAP nomenclature: AC-1   • Note: (Reference) 01/17/2022 Comment   Final    Comment: For more information about Hep-2 cell patterns use  ANApatterns.org, the official website for the International  Consensus on Antinuclear Antibody (CHIKI) Patterns (ICAP).  ------------------------------------------------------------  A positive CHIKI result may occur in healthy individuals (low  titer) or be associated with a variety of diseases.  See  interpretation chart which is not all inclusive:  Pattern      Antigen Detected  Suggested Disease Association  -----------  ----------------  -----------------------------  Homogeneous  DNA(ds,ss),       SLE - High titers               Nucleosomes,               Histones          Drug-induced SLE  -----------  ----------------  -----------------------------  Speckled     Sm, RNP, SCL-70,  SLE,MCTD,PSS (diffuse form),               SS-A/SS-B         Sjogrens  -----------  ----------------  -----------------------------  Nucleolar    SCL-70, PM-1/SCL  High titers Scleroderma,                                 PM/DM  -----------                              ----------------  -----------------------------  Centromere   Centromere        PSS (limited form) w/Crest                                 syndrome variable  -----------  ----------------  -----------------------------  Nuclear Dot  [id number]  Primary Biliary Cirrhosis  -----------  ----------------  -----------------------------  Nuclear      ,            Primary Biliary Cirrhosis  Membrane     maldnoado A,B,C  -----------  ----------------  -----------------------------        XR Knee 1 or 2 View Left (In Office)    Result Date: 1/3/2022  Narrative: EXAM:   XR Left Knee, 1 or 2 Views  EXAM DATE:   1/3/2022 2:59 PM  CLINICAL HISTORY:   left knee pain; M25.562-Pain in left knee  TECHNIQUE:   Frontal and/or lateral views of the left knee.  COMPARISON:   No relevant prior studies available.  FINDINGS:   BONES/JOINTS:  Unremarkable.  No acute fracture.  No dislocation.   SOFT TISSUES:  Prepatellar soft tissue swelling. Tiny knee joint effusion.      Impression:   Prepatellar soft tissue swelling. Tiny knee joint effusion.  This report was finalized on 1/3/2022 3:07 PM by Dr. Eric Valerio MD.      XR Chest PA & Lateral    Result Date: 1/3/2022  Narrative: EXAM:   XR Chest, 2 Views  EXAM DATE:   1/3/2022 2:52 PM  CLINICAL HISTORY:   hypertension; I10-Essential (primary) hypertension  TECHNIQUE:   Frontal and lateral views of the chest.  COMPARISON:   No relevant prior studies available.  FINDINGS:   LUNGS:  Unremarkable.  No consolidation.   PLEURAL SPACE:  Unremarkable.  No pneumothorax.   HEART:  Unremarkable.  No cardiomegaly.   MEDIASTINUM:  Unremarkable.   BONES/JOINTS:  Unremarkable.      Impression:   No acute findings in the chest.  This report was finalized on 1/3/2022 3:05 PM by Dr. Eric Valerio MD.        ASSESSMENT: The patient is a very pleasant 41 y.o. male  with neutrophilia    PROBLEM LIST:   1.  Leukocytosis neutrophilia:  A.  First observed November 22, 2021 and progressed  after repeating test December 2021  2.  Macrocytosis  3.  Tobacco abuse  4.  Hypertension    PLAN:  1. I did go over the blood work results from 1/17/2022. I reassured the patient his leukocytosis is improved from 21,00 to 13,000.  2. I reassured the patient JAK2 mutation as well as BCR ABL both came back negative.  3. The patient CHIKI came back positive ratio 1:1 60 CRP was slightly elevated at 1.5.  I offered the patient referral to rheumatology versus repeat testing in 4 months and patient and his wife are comfortable with repeating testing on return.  4. The patient was advised to quit smoking.  5. He will follow-up with me in 4 months with repeated CBC.  6. I will order CT chest with contrast given his cough chronic tobacco abuse unexplained weight loss hemoptysis and night sweats.  7.  The patient was advised to quit smoking.    Jassi Rangel MD  1/31/2022

## 2022-02-14 ENCOUNTER — APPOINTMENT (OUTPATIENT)
Dept: CT IMAGING | Facility: HOSPITAL | Age: 42
End: 2022-02-14

## 2022-06-01 DIAGNOSIS — D72.9 NEUTROPHILIA: Primary | ICD-10-CM

## 2022-06-03 ENCOUNTER — TELEPHONE (OUTPATIENT)
Dept: ONCOLOGY | Facility: CLINIC | Age: 42
End: 2022-06-03

## 2022-06-03 NOTE — TELEPHONE ENCOUNTER
----- Message from Sumaya Finn sent at 6/2/2022  2:38 PM EDT -----  Regarding: Fever  I just rescheduled this patient. They mentioned he has been having a fever for 3 months now at night and it gets up to 103 every night. I just thought you might want to be aware.     Thanks!     Sumaya

## 2022-06-03 NOTE — TELEPHONE ENCOUNTER
I discussed with Dr. Rangel who advised he may need to get in with his PCP sooner as it sounds like he has an infection but that he needs to keep his f/u with us as rescheduled on 6/20.  I advised his wife when calling them of this and that Dr. Rangel wants him to get his ct scan prior that he missed before he sees us as well. Sent message to Sumaya to get scan sent back to scheduling to arrange.  I advised his wife that someone should be contacting them.

## 2022-06-20 ENCOUNTER — LAB (OUTPATIENT)
Dept: LAB | Facility: HOSPITAL | Age: 42
End: 2022-06-20

## 2022-06-20 ENCOUNTER — OFFICE VISIT (OUTPATIENT)
Dept: ONCOLOGY | Facility: CLINIC | Age: 42
End: 2022-06-20

## 2022-06-20 VITALS
OXYGEN SATURATION: 95 % | DIASTOLIC BLOOD PRESSURE: 72 MMHG | WEIGHT: 130 LBS | SYSTOLIC BLOOD PRESSURE: 139 MMHG | HEIGHT: 69 IN | HEART RATE: 103 BPM | TEMPERATURE: 98.4 F | RESPIRATION RATE: 18 BRPM | BODY MASS INDEX: 19.26 KG/M2

## 2022-06-20 DIAGNOSIS — D72.9 NEUTROPHILIA: ICD-10-CM

## 2022-06-20 DIAGNOSIS — D72.9 NEUTROPHILIA: Primary | ICD-10-CM

## 2022-06-20 LAB
CRP SERPL-MCNC: 5.88 MG/DL (ref 0–0.5)
ERYTHROCYTE [DISTWIDTH] IN BLOOD BY AUTOMATED COUNT: 13.9 % (ref 12.3–15.4)
ERYTHROCYTE [SEDIMENTATION RATE] IN BLOOD: 32 MM/HR (ref 0–15)
HCT VFR BLD AUTO: 43.5 % (ref 37.5–51)
HGB BLD-MCNC: 13.6 G/DL (ref 13–17.7)
LYMPHOCYTES # BLD AUTO: 2.2 10*3/MM3 (ref 0.7–3.1)
LYMPHOCYTES NFR BLD AUTO: 6.1 % (ref 19.6–45.3)
MCH RBC QN AUTO: 30.5 PG (ref 26.6–33)
MCHC RBC AUTO-ENTMCNC: 31.4 G/DL (ref 31.5–35.7)
MCV RBC AUTO: 97.2 FL (ref 79–97)
MONOCYTES # BLD AUTO: 0.4 10*3/MM3 (ref 0.1–0.9)
MONOCYTES NFR BLD AUTO: 1.2 % (ref 5–12)
NEUTROPHILS NFR BLD AUTO: 33.6 10*3/MM3 (ref 1.7–7)
NEUTROPHILS NFR BLD AUTO: 92.7 % (ref 42.7–76)
PLATELET # BLD AUTO: 451 10*3/MM3 (ref 140–450)
PMV BLD AUTO: 8.5 FL (ref 6–12)
RBC # BLD AUTO: 4.47 10*6/MM3 (ref 4.14–5.8)
WBC NRBC COR # BLD: 36.2 10*3/MM3 (ref 3.4–10.8)

## 2022-06-20 PROCEDURE — 86038 ANTINUCLEAR ANTIBODIES: CPT

## 2022-06-20 PROCEDURE — 86140 C-REACTIVE PROTEIN: CPT

## 2022-06-20 PROCEDURE — 87040 BLOOD CULTURE FOR BACTERIA: CPT

## 2022-06-20 PROCEDURE — 85025 COMPLETE CBC W/AUTO DIFF WBC: CPT

## 2022-06-20 PROCEDURE — 85652 RBC SED RATE AUTOMATED: CPT

## 2022-06-20 PROCEDURE — 99214 OFFICE O/P EST MOD 30 MIN: CPT | Performed by: INTERNAL MEDICINE

## 2022-06-20 PROCEDURE — 36415 COLL VENOUS BLD VENIPUNCTURE: CPT

## 2022-06-20 NOTE — PROGRESS NOTES
DATE OF VISIT: 6/20/2022    REASON FOR VISIT: Followup for neutrophilia     PROBLEM LIST:  1.  Leukocytosis neutrophilia:  A.  First observed November 22, 2021 and progressed after repeating test December 2021  B.  Negative JAK2 mutation as well as BCR ABL gene rearrangement  2.  Macrocytosis  3.  Tobacco abuse  4.  Hypertension      HISTORY OF PRESENT ILLNESS: The patient is a very pleasant 41 y.o. male  with past medical history significant for neutrophilia diagnosed December 2021. The  patient is here today for scheduled follow-up visit.    SUBJECTIVE: The patient is here today with his wife.  Has been having fevers that happen oh every day in the afternoon temperature as high as 103.  He continued to have poor appetite.  Unfortunately he canceled his appointment over the CT scan and rescheduled for next week June 27, 2022.    Past History:  Medical History: has a past medical history of Anxiety and Hypertension.   Surgical History: has a past surgical history that includes Replacement total knee (2015) and Ankle Stabilization (2003).   Family History: family history includes COPD in his mother; Diabetes in his father; Heart disease in his father; Hypertension in his father.   Social History: reports that he has been smoking. He started smoking about 23 years ago. He has a 34.50 pack-year smoking history. He has never used smokeless tobacco. He reports previous alcohol use. He reports previous drug use.    (Not in a hospital admission)     Allergies: Amoxicillin     Review of Systems   Constitutional: Positive for appetite change, fatigue and fever.   Gastrointestinal: Negative.    Genitourinary: Negative.        PHYSICAL EXAMINATION:   There were no vitals taken for this visit.   There were no vitals filed for this visit.                  ECOG Performance Status: 1 - Symptomatic but completely ambulatory      General Appearance:      alert, cooperative, no apparent distress and appears stated age   Lungs:    Clear to auscultation bilaterally; respirations regular, even, and unlabored bilaterally   Heart:  Regular rate and rhythm, no murmurs appreciated   Abdomen:   Soft, non-tender, non-distended and no organomegaly                 No visits with results within 2 Week(s) from this visit.   Latest known visit with results is:   Consult on 01/17/2022   Component Date Value Ref Range Status   • Ferritin 01/17/2022 183.60  30.00 - 400.00 ng/mL Final   • Sed Rate 01/17/2022 21 (A) 0 - 15 mm/hr Final   • Folate 01/17/2022 5.61  4.78 - 24.20 ng/mL Final   • Vitamin B-12 01/17/2022 1,812 (A) 211 - 946 pg/mL Final   • TSH 01/17/2022 2.380  0.270 - 4.200 uIU/mL Final   • Iron 01/17/2022 88  59 - 158 mcg/dL Final   • Iron Saturation 01/17/2022 24  20 - 50 % Final   • Transferrin 01/17/2022 249  200 - 360 mg/dL Final   • TIBC 01/17/2022 371  298 - 536 mcg/dL Final   • Reticulocyte % 01/17/2022 1.23  0.70 - 1.90 % Final   • Reticulocyte Absolute 01/17/2022 0.0544  0.0200 - 0.1300 10*6/mm3 Final   • LDH 01/17/2022 206  135 - 225 U/L Final   • CHIKI 01/17/2022 Positive (A)  Final                                         Negative   <1:80                                       Borderline  1:80                                       Positive   >1:80   • Hepatitis B Surface Ag 01/17/2022 Non-Reactive  Non-Reactive Final   • Hep A IgM 01/17/2022 Non-Reactive  Non-Reactive Final   • Hep B C IgM 01/17/2022 Non-Reactive  Non-Reactive Final   • Hepatitis C Ab 01/17/2022 Non-Reactive  Non-Reactive Final   • HIV-1/ HIV-2 01/17/2022 Non-Reactive  Non-Reactive Final   • Rheumatoid Factor Quantitative 01/17/2022 <10.0  0.0 - 14.0 IU/mL Final   • e13a2 (b2a2) Transcript 01/17/2022 Comment  % Final               <0.0032 %   • e14a2 (b3a2) Transcript 01/17/2022 Comment  % Final               <0.0032 %   • E1A2 transcript 01/17/2022 Comment  % Final               <0.0032 %   • Interpretation 01/17/2022 Negative   Final    NEGATIVE for the BCR-ABL1 e1a2  (p190), e13a2 (b2a2, p210) and e14a2  (b3a2, p210) fusion transcripts. These results do not rule out the  presence of rare BCR-ABL1 transcripts not detected by this assay.   • Director Review 01/17/2022 Comment   Final    Demario Winslow MD, PhD  Director, Molecular Oncology  [facility] [place] [address]  [phone]   • Background: 01/17/2022 Comment   Final    This assay can detect three different types of BCR-ABL1 fusion  transcripts associated with CML, ALL, and AML: e13a2 (previously  b2a2) and e14a2 (previously b3a2) (major breakpoint, p210), as  well as e1a2 (minor breakpoint, p190). The e13a2 and e14a2 transcript  values are titrated to the current International Scale (IS). The  standardized baseline is 100% BCR-ABL1 (IS) and major molecular  response (MMR) is equivalent to 0.1% BCR-ABL1 (IS) corresponding  to a 3-log reduction. Results should be correlated with appropriate  clinical and laboratory information as indicated.   • Methodology: 01/17/2022 Comment   Final    Total RNA is isolated from the sample and subject to a real-time,  reverse transcriptase polymerase chain reaction (RT-PCR). The PCR  primers and probes are specific for BCR-ABL1 e13a2, e14a2 and e1a2  fusion transcripts. The ABL1 transcript is amplified as the control  for cDNA quantity and quality. Serial dilutions of a validated  positive control RNA with known t(9;22) BCR-ABL1 are used as  reference for quantification of BCR-ABL1 relative to ABL1. The  numeric BCR-ABL1 level is reported as % BCR-ABL1/ABL1 and the  detection sensitivity is 4.5 log below the standard baseline  (<0.0032%).  This test was developed and its performance characteristics determined  by [facility]. It has not been cleared or approved by the Food and Drug  Administration.   • C-Reactive Protein 01/17/2022 1.48 (A) 0.00 - 0.50 mg/dL Final   • JAK2 V617F Mutation 01/17/2022 Comment   Final    Result: NEGATIVE for  the JAK2 V617F mutation.  Interpretation:  The G to T nucleotide change encoding the V617F  mutation was not detected.  This result does not rule out the presence  of the JAK2 mutation at a level below the sensitivity of detection of  this assay, or the presence of other mutations within JAK2 not  detected by this assay.  This result does not rule out a diagnosis of  polycythemia vera, essential thrombocythemia or idiopathic  myelofibrosis as the V617F mutation is not detected in all patients  with these disorders.   • Background: 01/17/2022 Comment   Final    Comment: JAK2 is a cytoplasmic tyrosine kinase with a key role in signal  transduction from multiple hematopoietic growth factor receptors. A  point mutation within exon 14 of the JAK2 gene ([id number]) encoding a  valine to phenylalanine substitution at position 617 of the JAK2  protein (V617F) has been identified in most patients with polycythemia  vera, and in about half of those with either essential thrombocythemia  or idiopathic myelofibrosis. The V617F has also been detected,  although infrequently, in other myeloid disorders such as chronic  myelomonocytic leukemia and chronic neutrophilic luekemia. V617F is  an acquired mutation that alters a highly conserved valine present in  the negative regulatory JH2 domain of the JAK2 protein and is  predicted to dysregulate kinase activity.  Methodology:  Total genomic DNA was extracted and subjected to TaqMan real-time PCR  amplification/detection. Two amplification products per sample were  monitored by real-time PCR using primers/probes specific                            to JAK2 wild  type (WT) and JAK2 mutant V617F. The Protonet Absolute Quantitation  software will compare the patient specimen valuse to the standard  curves and generate percent values for wild type and mutant type.  In vitro studies have indicated that this assay has an analytical  sensitivity of 1%.  References:  Abhishek HOBBS, Dougie CARNEY, Raymond  PJ, et al. Acquired mutation of the  tyrosine kinase JAK2 in human myeloproliferative disorders. Lancet.  2005 Mar 19-25; 365(5332):2975-7012.  Jose Carlos C, Luiz V, Jennie Banuelos AMMON. A unique clonal JAK2 mutation leading  to constitutive signaling causes polycythaemia vera. Nature. 2005 Apr 28; 000(9652):0976-6307.  Prakash R, Caty F, Nico AS, et al. A gain-of-function  mutation of JAK2 in myeloproliferative disorders. N Engl J Med. 2005  Apr 28; 352(14):3542-7014.   • Director Review 01/17/2022 Comment   Final    Horacio Mustafa, PhD, [facility]      Director, Molecular Oncology      [facility] [place] [address]      [phone]  This test was developed and its performance characteristics  determined by [facility]. It has not been cleared or approved  by the Food and Drug Administration.   • WBC 01/17/2022 12.60 (A) 3.40 - 10.80 10*3/mm3 Final   • RBC 01/17/2022 4.45  4.14 - 5.80 10*6/mm3 Final   • Hemoglobin 01/17/2022 14.9  13.0 - 17.7 g/dL Final   • Hematocrit 01/17/2022 44.5  37.5 - 51.0 % Final   • RDW 01/17/2022 13.5  12.3 - 15.4 % Final   • MCV 01/17/2022 100.1 (A) 79.0 - 97.0 fL Final   • MCH 01/17/2022 33.6 (A) 26.6 - 33.0 pg Final   • MCHC 01/17/2022 33.5  31.5 - 35.7 g/dL Final   • MPV 01/17/2022 8.0  6.0 - 12.0 fL Final   • Platelets 01/17/2022 423  140 - 450 10*3/mm3 Final   • Neutrophil % 01/17/2022 76.0  42.7 - 76.0 % Final   • Lymphocyte % 01/17/2022 20.4  19.6 - 45.3 % Final   • Monocyte % 01/17/2022 3.6 (A) 5.0 - 12.0 % Final   • Neutrophils, Absolute 01/17/2022 9.60 (A) 1.70 - 7.00 10*3/mm3 Final   • Lymphocytes, Absolute 01/17/2022 2.60  0.70 - 3.10 10*3/mm3 Final   • Monocytes, Absolute 01/17/2022 0.50  0.10 - 0.90 10*3/mm3 Final   • Homogeneous Pattern 01/17/2022 1:160 (A)  Final    ICAP nomenclature: AC-1   • Note: (Reference) 01/17/2022 Comment   Final    Comment: For more information about Hep-2 cell patterns use  ANApatterns.org, the  official website for the International  Consensus on Antinuclear Antibody (CHIKI) Patterns (ICAP).  ------------------------------------------------------------  A positive CHIKI result may occur in healthy individuals (low  titer) or be associated with a variety of diseases.  See  interpretation chart which is not all inclusive:  Pattern      Antigen Detected  Suggested Disease Association  -----------  ----------------  -----------------------------  Homogeneous  DNA(ds,ss),       SLE - High titers               Nucleosomes,               Histones          Drug-induced SLE  -----------  ----------------  -----------------------------  Speckled     Sm, RNP, SCL-70,  SLE,MCTD,PSS (diffuse form),               SS-A/SS-B         Sjogrens  -----------  ----------------  -----------------------------  Nucleolar    SCL-70, PM-1/SCL  High titers Scleroderma,                                 PM/DM  -----------                             ----------------  -----------------------------  Centromere   Centromere        PSS (limited form) w/Crest                                 syndrome variable  -----------  ----------------  -----------------------------  Nuclear Dot  [id number]  Primary Biliary Cirrhosis  -----------  ----------------  -----------------------------  Nuclear      ,            Primary Biliary Cirrhosis  Membrane     maldonado A,B,C  -----------  ----------------  -----------------------------        No results found.    ASSESSMENT: The patient is a very pleasant 41 y.o. male  with neutrophilia      PLAN:    1.  Neutrophilia:  A.  I did go over the CBC result with the patient from today.  I explained to the patient that his white blood cells has jumped to 36,000.  B.  I am not really clear about the source of his neutrophilia although a more than by underlying malignancy given his unexplained weight loss.  C.  His CT chest is rescheduled for next Monday I will follow-up on the result.    2.   Hypertension:  A.  The patient will continue lisinopril 10 mg daily    3.  Tobacco abuse:  A.  I explained to the patient that smoking could increase his risk of malignancy.  B.  Again patient was advised to quit smoking.    4.  Macrocytosis:  A.  I did go over his MCV result from today.  I explained to the patient his MCV is down to 97.2.    5.  Positive CHIKI:  A.  The ratio was 1:160 5 months ago.  B.  I will repeat this today as well as a CRP.  C.  If the patient has positive inflammatory markers he might benefit from prednisone and referral to rheumatology.    6.  Fevers:  A.  I will check the patient blood cultures today.  I will hold off on adding antibiotics at this point since this has been a chronic problem for him.    FOLLOW UP: 1 months with repeat CBC or sooner if his CT chest reveal any abnormalities.    Jassi Rnagel MD  6/20/2022

## 2022-06-22 LAB — ANA SER QL: NEGATIVE

## 2022-06-25 LAB — BACTERIA SPEC AEROBE CULT: NORMAL

## 2022-06-27 ENCOUNTER — TELEPHONE (OUTPATIENT)
Dept: ONCOLOGY | Facility: CLINIC | Age: 42
End: 2022-06-27

## 2022-06-27 ENCOUNTER — HOSPITAL ENCOUNTER (OUTPATIENT)
Dept: CT IMAGING | Facility: HOSPITAL | Age: 42
Discharge: HOME | End: 2022-06-27
Admitting: INTERNAL MEDICINE

## 2022-06-27 DIAGNOSIS — D72.9 NEUTROPHILIA: Primary | ICD-10-CM

## 2022-06-27 DIAGNOSIS — R70.0 ELEVATED SEDIMENTATION RATE: ICD-10-CM

## 2022-06-27 DIAGNOSIS — R04.2 HEMOPTYSIS: ICD-10-CM

## 2022-06-27 DIAGNOSIS — R79.82 ELEVATED C-REACTIVE PROTEIN (CRP): ICD-10-CM

## 2022-06-27 LAB — CREAT BLDA-MCNC: 0.7 MG/DL (ref 0.6–1.3)

## 2022-06-27 PROCEDURE — 71260 CT THORAX DX C+: CPT

## 2022-06-27 PROCEDURE — 82565 ASSAY OF CREATININE: CPT

## 2022-06-27 PROCEDURE — 25010000002 IOPAMIDOL 61 % SOLUTION: Performed by: INTERNAL MEDICINE

## 2022-06-27 RX ADMIN — IOPAMIDOL 90 ML: 612 INJECTION, SOLUTION INTRAVENOUS at 11:26

## 2022-06-27 NOTE — TELEPHONE ENCOUNTER
Called and let patient know Dr. Rangel reviewed his scans and labs.  The ct chest did not show any signs of cancer. His labs do show positive inflammatory markers that we are going to refer him to rheumatology for.  I advised that Dr. Rangel wants to see him back in 1 month and mentioned if his WBC continues to be elevated we may have to consider to a bone marrow biopsy which he will discuss more at that visit pending his test results.  Patient verbalized understanding.

## 2022-06-28 NOTE — TELEPHONE ENCOUNTER
Left a voicemail for patient with appointment of 8/8/22 at 3:15pm with Dr. Rangel. Stated to call us back if that did not work for him.

## 2022-07-29 RX ORDER — VARENICLINE TARTRATE 0.5 MG/1
TABLET, FILM COATED ORAL
Qty: 11 TABLET | Refills: 0 | Status: SHIPPED | OUTPATIENT
Start: 2022-07-29

## 2022-07-29 RX ORDER — VARENICLINE TARTRATE 1 MG/1
1 TABLET, FILM COATED ORAL 2 TIMES DAILY
Qty: 60 TABLET | Refills: 0 | Status: SHIPPED | OUTPATIENT
Start: 2022-07-29 | End: 2022-08-28

## 2022-08-15 ENCOUNTER — OFFICE VISIT (OUTPATIENT)
Dept: FAMILY MEDICINE CLINIC | Facility: CLINIC | Age: 42
End: 2022-08-15

## 2022-08-15 VITALS
TEMPERATURE: 98.4 F | HEIGHT: 69 IN | BODY MASS INDEX: 19.37 KG/M2 | DIASTOLIC BLOOD PRESSURE: 74 MMHG | SYSTOLIC BLOOD PRESSURE: 136 MMHG | RESPIRATION RATE: 20 BRPM | HEART RATE: 86 BPM | WEIGHT: 130.8 LBS | OXYGEN SATURATION: 97 %

## 2022-08-15 DIAGNOSIS — F17.210 CIGARETTE NICOTINE DEPENDENCE WITHOUT COMPLICATION: ICD-10-CM

## 2022-08-15 DIAGNOSIS — I27.21 PULMONARY ARTERIAL HYPERTENSION: Primary | ICD-10-CM

## 2022-08-15 PROCEDURE — 99213 OFFICE O/P EST LOW 20 MIN: CPT | Performed by: PSYCHOLOGIST

## 2022-08-15 RX ORDER — IBUPROFEN 800 MG/1
800 TABLET ORAL AS NEEDED
COMMUNITY
Start: 2022-08-12 | End: 2022-11-16

## 2022-08-15 RX ORDER — NIFEDIPINE 10 MG/1
10 CAPSULE ORAL
Qty: 15 CAPSULE | Refills: 0 | Status: SHIPPED | OUTPATIENT
Start: 2022-08-15 | End: 2022-09-15 | Stop reason: SDUPTHER

## 2022-08-15 RX ORDER — ORPHENADRINE CITRATE 100 MG/1
TABLET, EXTENDED RELEASE ORAL
COMMUNITY
Start: 2022-08-12

## 2022-08-15 RX ORDER — PREDNISONE 20 MG/1
TABLET ORAL
COMMUNITY
Start: 2022-08-12 | End: 2022-11-08

## 2022-08-15 NOTE — PROGRESS NOTES
"Chief Complaint  Follow-up (PT NEEDING TO DISCUSS SMOKING CESSATION)    Subjective        Tobias Mariscal presents to [facility]   C/O FOLLOW UP SMOKING CESSATION 2. NEEDS A REFERRAL TO PULMO SEC TO PAH S/P COVID AND PAH   Nicotine Dependence  Presents for initial visit. Preferred tobacco types include cigarettes. Preferred cigarette types include filtered. Preferred strength is regular. Preferred cigarettes are non-menthol. Preferred brands include Basic. His urge triggers include driving, meal time and stress. Risk factors do not include drinking alcohol.His first smoke is from 6 to 8 AM. He smokes 2 packs of cigarettes per day. He started smoking when he was >18 years old. The treatment provided significant relief. Compliance with prior treatments has been good. Tobias is ready to quit. Tobias has tried to quit 2 times. There is no history of alcohol abuse and drug use.   HE HAS STARTED HIS SMOKING CESSATION WITH [employer]ANTIN[employer] X 2 WEEKS AGO.  HE IS REPONDING WELL AND NOW DOWN TO 1/2 PPD. HE IS RESPONDING WELL.      Objective   Vital Signs:  /74 (BP Location: Left arm, Patient Position: Sitting, Cuff Size: Adult)   Pulse 86   Temp 98.4 °F (36.9 °C) (Temporal)   Resp 20   Ht 175.3 cm (69\")   Wt 59.3 kg (130 lb 12.8 oz)   SpO2 97%   BMI 19.32 kg/m²   Estimated body mass index is 19.32 kg/m² as calculated from the following:    Height as of this encounter: 175.3 cm (69\").    Weight as of this encounter: 59.3 kg (130 lb 12.8 oz).    BMI is within normal parameters. No other follow-up for BMI required.      Physical Exam   Result Review :  The following data was reviewed by: Vasiliy Santos MD on 08/15/2022:  Common labs    Common Labsle 1/17/22 6/20/22 6/27/22   Creatinine   0.70   WBC 12.60 (A) 36.20 (A)    Hemoglobin 14.9 13.6    Hematocrit 44.5 43.5    Platelets 423 451 (A)    (A) Abnormal value       Comments are available for some flowsheets but are not being displayed.    "           Assessment and Plan   Diagnoses and all orders for this visit:    1. Pulmonary arterial hypertension (HCC) (Primary)  -     Ambulatory Referral to Pulmonology    2. Cigarette nicotine dependence without complication  Assessment & Plan:  Tobacco use is improving with treatment.  Smoking cessation counseling was provided.  Pharmacotherapy was prescribed as ordered.  Tobacco use will be reassessed at the next regular appointment.      Other orders  -     NIFEdipine (PROCARDIA) 10 MG capsule; Take 1 capsule by mouth Daily With Breakfast for 15 days.  Dispense: 15 capsule; Refill: 0        I spent 20 minutes caring for Tobias on this date of service. This time includes time spent by me in the following activities:reviewing tests, performing a medically appropriate examination and/or evaluation , counseling and educating the patient/family/caregiver, ordering medications, tests, or procedures and documenting information in the medical record     Follow Up   Return in about 1 month (around 9/15/2022) for Recheck SMOKING CESSATION.  Patient was given instructions and counseling regarding his condition or for health maintenance advice. Please see specific information pulled into the AVS if appropriate.         This document has been electronically signed by Vasiliy Santos MD  August 15, 2022 16:50 EDT

## 2022-08-15 NOTE — ASSESSMENT & PLAN NOTE
Tobacco use is improving with treatment.  Smoking cessation counseling was provided.  Pharmacotherapy was prescribed as ordered.  Tobacco use will be reassessed at the next regular appointment.

## 2022-09-12 ENCOUNTER — TELEPHONE (OUTPATIENT)
Dept: FAMILY MEDICINE CLINIC | Facility: CLINIC | Age: 42
End: 2022-09-12

## 2022-09-12 NOTE — TELEPHONE ENCOUNTER
Patient Blood Pressure medicine was never prior authorized and he never got to take it. Wants it resent so he can start taking it.   orly

## 2022-09-15 ENCOUNTER — PRIOR AUTHORIZATION (OUTPATIENT)
Dept: FAMILY MEDICINE CLINIC | Facility: CLINIC | Age: 42
End: 2022-09-15

## 2022-09-15 RX ORDER — NIFEDIPINE 10 MG/1
10 CAPSULE ORAL
Qty: 30 CAPSULE | Refills: 0 | Status: SHIPPED | OUTPATIENT
Start: 2022-09-15 | End: 2022-10-15

## 2022-09-15 NOTE — TELEPHONE ENCOUNTER
Approved today  The request has been approved. The authorization is effective for a maximum of 12 fills from 09/15/2022 to 09/14/2023, as long as the member is enrolled in their current health plan. The request was approved as submitted. A written notification letter will follow with additional details.

## 2022-11-08 ENCOUNTER — OFFICE VISIT (OUTPATIENT)
Dept: FAMILY MEDICINE CLINIC | Facility: CLINIC | Age: 42
End: 2022-11-08

## 2022-11-08 VITALS
HEIGHT: 69 IN | BODY MASS INDEX: 19.58 KG/M2 | DIASTOLIC BLOOD PRESSURE: 76 MMHG | RESPIRATION RATE: 20 BRPM | TEMPERATURE: 98.6 F | HEART RATE: 100 BPM | SYSTOLIC BLOOD PRESSURE: 134 MMHG | WEIGHT: 132.2 LBS | OXYGEN SATURATION: 94 %

## 2022-11-08 DIAGNOSIS — G44.89 OTHER HEADACHE SYNDROME: ICD-10-CM

## 2022-11-08 DIAGNOSIS — R10.9 ABDOMINAL PAIN, UNSPECIFIED ABDOMINAL LOCATION: ICD-10-CM

## 2022-11-08 DIAGNOSIS — R53.83 OTHER FATIGUE: ICD-10-CM

## 2022-11-08 DIAGNOSIS — K62.5 RECTAL BLEEDING: Primary | ICD-10-CM

## 2022-11-08 PROCEDURE — 99214 OFFICE O/P EST MOD 30 MIN: CPT | Performed by: PSYCHOLOGIST

## 2022-11-08 RX ORDER — PYRAZINAMIDE 500 MG/1
1 TABLET ORAL EVERY 6 HOURS PRN
Qty: 21 TABLET | Refills: 0 | Status: SHIPPED | OUTPATIENT
Start: 2022-11-08 | End: 2022-11-18

## 2022-11-08 NOTE — PROGRESS NOTES
Chief Complaint  Rectal Bleeding (At least 2-3 months), Weight Loss, Abdominal Pain (States a couple of times per week), Headache (3-4x per week in past month), and Fatigue (States just feels run down)    Subjective        Tobias Mariscal presents to [facility]   C/o an  Acute medical visit as his PCP( HE IS POOPING DARK STOOLS)  :  Rectal Bleeding  This is a recurrent problem. The current episode started more than 1 month ago. The problem occurs daily. The problem has been gradually worsening. Associated symptoms include abdominal pain, arthralgias, fatigue, a fever (at night ) and headaches. Pertinent negatives include no nausea, rash, sore throat or vomiting. He has tried nothing for the symptoms.   Abdominal Pain  This is a recurrent problem. The current episode started more than 1 month ago. The onset quality is gradual. The problem occurs intermittently. The pain is located in the RLQ and LLQ. The pain is mild. The quality of the pain is cramping. Associated symptoms include arthralgias, a fever (at night ), headaches and hematochezia. Pertinent negatives include no nausea or vomiting. He has tried nothing for the symptoms. There is no history of abdominal surgery.   Headache  Headache pattern:  Headache sometimes there, sometimes not at all  Previous testing:  Blood work  Time of day symptoms are worse:  No specific time of day  Do headaches wake patient from sleep?: No    Days of the week symptoms are worse:  No specific day of the week  Season symptoms are worse:  No particular season  Quality:  Pounding and sharp  Location:  Front/forehead, top of head and back of head  Pain severity:  8  Fatigue  This is a new problem. The current episode started 1 to 4 weeks ago. The problem has been gradually worsening. Associated symptoms include abdominal pain, arthralgias, fatigue, a fever (at night ) and headaches. Pertinent negatives include no nausea, rash, sore throat or vomiting.  "Nothing aggravates the symptoms.       Objective   Vital Signs:  /76 (BP Location: Left arm, Patient Position: Sitting, Cuff Size: Adult)   Pulse 100   Temp 98.6 °F (37 °C) (Temporal)   Resp 20   Ht 175.3 cm (69\")   Wt 60 kg (132 lb 3.2 oz)   SpO2 94%   BMI 19.52 kg/m²   Estimated body mass index is 19.52 kg/m² as calculated from the following:    Height as of this encounter: 175.3 cm (69\").    Weight as of this encounter: 60 kg (132 lb 3.2 oz).    BMI is within normal parameters. No other follow-up for BMI required.      Physical Exam  Vitals and nursing note reviewed.   Constitutional:       Appearance: Normal appearance.   HENT:      Head: Normocephalic.      Right Ear: Tympanic membrane normal.      Left Ear: Tympanic membrane normal.      Nose: Nose normal.      Mouth/Throat:      Mouth: Mucous membranes are moist.   Eyes:      Extraocular Movements: Extraocular movements intact.      Pupils: Pupils are equal, round, and reactive to light.   Cardiovascular:      Rate and Rhythm: Normal rate and regular rhythm.      Pulses: Normal pulses.      Heart sounds: Normal heart sounds.   Pulmonary:      Effort: Pulmonary effort is normal.      Breath sounds: Normal breath sounds.   Abdominal:      General: Abdomen is flat. Bowel sounds are normal.      Palpations: Abdomen is soft.   Musculoskeletal:         General: Normal range of motion.      Cervical back: Normal range of motion and neck supple.   Skin:     General: Skin is warm.      Capillary Refill: Capillary refill takes less than 2 seconds.   Neurological:      General: No focal deficit present.      Mental Status: He is alert and oriented to person, place, and time.   Psychiatric:         Mood and Affect: Mood normal.        Result Review :{Labs  Result Review  Imaging  Med Tab  Media  Procedures  :23}  The following data was reviewed by: Vasiliy Santos MD on 11/08/2022:  Common labs    Common Labs 1/17/22 6/20/22 6/27/22   Creatinine   " 0.70   WBC 12.60 (A) 36.20 (A)    Hemoglobin 14.9 13.6    Hematocrit 44.5 43.5    Platelets 423 451 (A)    (A) Abnormal value       Comments are available for some flowsheets but are not being displayed.               Assessment and Plan   Diagnoses and all orders for this visit:    1. Rectal bleeding (Primary)  -     CBC & Differential  -     Comprehensive Metabolic Panel  -     TSH  -     Ambulatory Referral For Screening Colonoscopy    2. Abdominal pain, unspecified abdominal location  -     Ambulatory Referral For Screening Colonoscopy  -     acetaminophen-codeine (TYLENOL/CODEINE #3) 300-30 MG per tablet; Take 1 tablet by mouth Every 6 (Six) Hours As Needed for Moderate Pain for up to 10 days.  Dispense: 21 tablet; Refill: 0    3. Other fatigue    4. Other headache syndrome           I spent 20 minutes caring for Tobias on this date of service. This time includes time spent by me in the following activities:reviewing tests, performing a medically appropriate examination and/or evaluation , counseling and educating the patient/family/caregiver, ordering medications, tests, or procedures and documenting information in the medical record   HEADACHE    Follow Up   Return in about 1 month (around 12/8/2022) for Recheck-- RECHECK  RECTAL BLEEDING/ COLONOSCOPY .  Patient was given instructions and counseling regarding his condition or for health maintenance advice. Please see specific information pulled into the AVS if appropriate.           This document has been electronically signed by Vasiliy Santos MD  November 8, 2022 16:50 EST

## 2022-11-16 ENCOUNTER — OFFICE VISIT (OUTPATIENT)
Dept: GASTROENTEROLOGY | Facility: CLINIC | Age: 42
End: 2022-11-16

## 2022-11-16 VITALS
WEIGHT: 135 LBS | HEIGHT: 69 IN | DIASTOLIC BLOOD PRESSURE: 86 MMHG | HEART RATE: 107 BPM | BODY MASS INDEX: 19.99 KG/M2 | SYSTOLIC BLOOD PRESSURE: 153 MMHG | OXYGEN SATURATION: 98 %

## 2022-11-16 DIAGNOSIS — K62.5 BRBPR (BRIGHT RED BLOOD PER RECTUM): Primary | ICD-10-CM

## 2022-11-16 DIAGNOSIS — Z12.11 ENCOUNTER FOR SCREENING FOR MALIGNANT NEOPLASM OF COLON: ICD-10-CM

## 2022-11-16 PROCEDURE — 99214 OFFICE O/P EST MOD 30 MIN: CPT | Performed by: PHYSICIAN ASSISTANT

## 2022-11-16 RX ORDER — POLYETHYLENE GLYCOL 3350 17 G/17G
510 POWDER, FOR SOLUTION ORAL TAKE AS DIRECTED
Qty: 510 G | Refills: 0 | Status: SHIPPED | OUTPATIENT
Start: 2022-11-16

## 2022-11-16 RX ORDER — BISACODYL 5 MG/1
20 TABLET, DELAYED RELEASE ORAL ONCE
Qty: 4 TABLET | Refills: 0 | Status: SHIPPED | OUTPATIENT
Start: 2022-11-16 | End: 2022-11-16

## 2022-11-16 NOTE — PROGRESS NOTES
Chief Complaint   Patient presents with   • GI Bleeding   • Abdominal Pain       Tobias Mariscal is a 42 y.o. male who presents to the office today for evaluation of GI Bleeding and Abdominal Pain      HPI  This is a new patient who presents for evaluation of abdominal pain and rectal bleeding. He was seen by his primary care provider, Dr. Santos, on 11/08/2022, and at that time he reported several episodes of rectal bleeding monthly. He has noted abdominal pain and weight loss as well.     The patient reports blood per rectum over the past 2 months with progressive worsening at the time this appointment was made; however, over the last couple of days, there has been no bleeding. Blood is bright red in the toilet bowl. He endorses feeling the urge to defecate, only to have blood per rectum. He does endorse minimal abdominal pain with cramping, though that has not been an issue very recently. His significant other who is with him today, reports that the patient has had an 80-pound weight loss over the last 5 years with 30 of that being in the last 2 months. He does report a poor appetite. He also reports night sweats. His significant other reports the patient has a white blood cell count of 30,000 and has been evaluated by an oncologist with no findings currently. Per the patient, he had a CT scan performed approximately one week ago; however, that is not available for review today. He denies any known family history of colon cancer.     Review of Systems   Constitutional: Positive for unexpected weight change. Negative for fever.   HENT: Negative for sore throat and trouble swallowing.    Eyes: Negative.    Respiratory: Negative for chest tightness.    Cardiovascular: Negative for chest pain.   Gastrointestinal: Positive for abdominal distention, abdominal pain, anal bleeding, blood in stool, constipation, nausea and vomiting. Negative for diarrhea and rectal pain.   Endocrine: Negative.    Genitourinary: Negative for  difficulty urinating.   Musculoskeletal: Negative for back pain and neck pain.   Skin: Negative.    Allergic/Immunologic: Negative.    Neurological: Positive for headaches. Negative for dizziness.   Hematological: Does not bruise/bleed easily.   Psychiatric/Behavioral: Positive for sleep disturbance.       ACTIVE PROBLEMS:   Specialty Problems    None      PAST MEDICAL HISTORY:  Past Medical History:   Diagnosis Date   • Anxiety    • GI (gastrointestinal bleed)    • Hypertension        SURGICAL HISTORY:  Past Surgical History:   Procedure Laterality Date   • ANKLE STABILIZATION  2003   • REPLACEMENT TOTAL KNEE  2015    left side -        FAMILY HISTORY:  Family History   Problem Relation Age of Onset   • COPD Mother    • Diabetes Father    • Hypertension Father    • Heart disease Father        SOCIAL HISTORY:  Social History     Tobacco Use   • Smoking status: Every Day     Packs/day: 1.50     Years: 23.00     Pack years: 34.50     Types: Cigarettes     Start date: 8/21/1998   • Smokeless tobacco: Never   Substance Use Topics   • Alcohol use: Not Currently       CURRENT MEDICATION:    Current Outpatient Medications:   •  orphenadrine (NORFLEX) 100 MG 12 hr tablet, , Disp: , Rfl:   •  ProAir  (90 Base) MCG/ACT inhaler, INHALE 2 PUFFS BY MOUTH EVERY 4 HOURS AS NEEDED FOR COUGH OR SHORTNESS OF BREATH, Disp: , Rfl:   •  varenicline (Chantix) 0.5 MG tablet, Take 1 tab daily x 3 days then 1 tab bid x 4 days, Disp: 11 tablet, Rfl: 0  •  busPIRone (BUSPAR) 7.5 MG tablet, Take 1 tablet by mouth 2 (Two) Times a Day for 90 days., Disp: 60 tablet, Rfl: 2  •  NIFEdipine (PROCARDIA) 10 MG capsule, Take 1 capsule by mouth Daily With Breakfast for 30 days., Disp: 30 capsule, Rfl: 0  •  polyethylene glycol (MIRALAX) 17 GM/SCOOP powder, Take 510 g by mouth Take As Directed. Place 15 cap fulls of powder in 32 oz of liquid of choice at 4:00 and 10:00 PM, Disp: 510 g, Rfl: 0    ALLERGIES:  Amoxicillin    VISIT VITALS:  /86   " Pulse 107   Ht 175.3 cm (69\")   Wt 61.2 kg (135 lb)   SpO2 98%   BMI 19.94 kg/m²   Physical Exam  Constitutional:       General: He is not in acute distress.     Appearance: Normal appearance. He is well-developed.   HENT:      Head: Normocephalic and atraumatic.   Eyes:      Pupils: Pupils are equal, round, and reactive to light.   Cardiovascular:      Rate and Rhythm: Normal rate and regular rhythm.      Heart sounds: Normal heart sounds.   Pulmonary:      Effort: Pulmonary effort is normal. No respiratory distress.      Breath sounds: Normal breath sounds. No wheezing, rhonchi or rales.   Abdominal:      General: Abdomen is flat. Bowel sounds are normal. There is no distension.      Palpations: Abdomen is soft. There is no mass.      Tenderness: There is no abdominal tenderness. There is no guarding or rebound.      Hernia: No hernia is present.   Musculoskeletal:         General: No swelling. Normal range of motion.      Cervical back: Normal range of motion and neck supple.      Right lower leg: No edema.      Left lower leg: No edema.   Skin:     General: Skin is warm and dry.   Neurological:      Mental Status: He is alert and oriented to person, place, and time.   Psychiatric:         Attention and Perception: Attention normal.         Mood and Affect: Mood normal.         Speech: Speech normal.         Behavior: Behavior normal. Behavior is cooperative.         Thought Content: Thought content normal.          Assessment       Diagnosis Plan   1. BRBPR (bright red blood per rectum)  Case Request    Follow Anesthesia Guidelines / Protocol    Obtain Informed Consent    Case Request    bisacodyl (DULCOLAX) 5 MG EC tablet    polyethylene glycol (MIRALAX) 17 GM/SCOOP powder      2. Encounter for screening for malignant neoplasm of colon  Case Request    Follow Anesthesia Guidelines / Protocol    Obtain Informed Consent    Case Request    bisacodyl (DULCOLAX) 5 MG EC tablet    polyethylene glycol (MIRALAX) " 17 GM/SCOOP powder        1. BRBPR (bright red blood per rectum)    - Colonoscopy ordered. Colon prep instructions discussed and provided to patient.   - Discussed differential diagnoses with regard to causes of blood per rectum to include malignant neoplasm and internal hemorrhoids and potential treatments of each.   - bisacodyl (Dulcolax) 5 mg   - polyethylene glycol (MiraLAX) 17 gm/scoop powder    2. Encounter for screening for malignant neoplasm of colon    - Colonoscopy ordered. Colon prep instructions discussed and provided to patient.   - Discussed differential diagnoses with regard to causes of blood per rectum to include malignant neoplasm and internal hemorrhoids and potential treatments of each.   - bisacodyl (Dulcolax) 5 mg  - polyethylene glycol (MiraLAX) 17 gm/scoop powder    Return after procedure.               This document has been electronically signed by Magalie Hernandez PA-C  November 27, 2022 19:33 EST    Part of this note may be an electronic transcription/translation of spoken language to printed text using the Dragon Dictation System.    Transcribed from ambient dictation for Magalie Hernandez PA-C by Stacey Sykes.  11/16/22   10:19 EST    Patient or patient representative verbalized consent to the visit recording.  I have personally performed the services described in this document as transcribed by the above individual, and it is both accurate and complete.

## 2022-11-22 PROBLEM — K62.5 BRBPR (BRIGHT RED BLOOD PER RECTUM): Status: ACTIVE | Noted: 2022-11-22

## 2022-11-22 PROBLEM — Z12.11 ENCOUNTER FOR SCREENING FOR MALIGNANT NEOPLASM OF COLON: Status: ACTIVE | Noted: 2022-11-22

## 2022-11-29 ENCOUNTER — TELEPHONE (OUTPATIENT)
Dept: GASTROENTEROLOGY | Facility: CLINIC | Age: 42
End: 2022-11-29

## 2023-04-13 ENCOUNTER — OFFICE VISIT (OUTPATIENT)
Dept: FAMILY MEDICINE CLINIC | Facility: CLINIC | Age: 43
End: 2023-04-13
Payer: COMMERCIAL

## 2023-04-13 VITALS
RESPIRATION RATE: 20 BRPM | SYSTOLIC BLOOD PRESSURE: 144 MMHG | WEIGHT: 134 LBS | HEIGHT: 69 IN | TEMPERATURE: 98 F | OXYGEN SATURATION: 96 % | HEART RATE: 91 BPM | DIASTOLIC BLOOD PRESSURE: 78 MMHG | BODY MASS INDEX: 19.85 KG/M2

## 2023-04-13 DIAGNOSIS — G43.019 INTRACTABLE MIGRAINE WITHOUT AURA AND WITHOUT STATUS MIGRAINOSUS: Primary | ICD-10-CM

## 2023-04-13 DIAGNOSIS — I10 PRIMARY HYPERTENSION: ICD-10-CM

## 2023-04-13 DIAGNOSIS — J01.00 ACUTE MAXILLARY SINUSITIS, RECURRENCE NOT SPECIFIED: ICD-10-CM

## 2023-04-13 PROCEDURE — 3078F DIAST BP <80 MM HG: CPT | Performed by: PSYCHOLOGIST

## 2023-04-13 PROCEDURE — 1159F MED LIST DOCD IN RCRD: CPT | Performed by: PSYCHOLOGIST

## 2023-04-13 PROCEDURE — 3077F SYST BP >= 140 MM HG: CPT | Performed by: PSYCHOLOGIST

## 2023-04-13 PROCEDURE — 1160F RVW MEDS BY RX/DR IN RCRD: CPT | Performed by: PSYCHOLOGIST

## 2023-04-13 PROCEDURE — 99214 OFFICE O/P EST MOD 30 MIN: CPT | Performed by: PSYCHOLOGIST

## 2023-04-13 RX ORDER — AZITHROMYCIN 250 MG/1
TABLET, FILM COATED ORAL
Qty: 6 TABLET | Refills: 1 | Status: SHIPPED | OUTPATIENT
Start: 2023-04-13

## 2023-04-13 RX ORDER — KETOROLAC TROMETHAMINE 30 MG/ML
60 INJECTION, SOLUTION INTRAMUSCULAR; INTRAVENOUS ONCE
Status: COMPLETED | OUTPATIENT
Start: 2023-04-13 | End: 2023-04-13

## 2023-04-13 RX ORDER — PYRAZINAMIDE 500 MG/1
1 TABLET ORAL EVERY 6 HOURS PRN
Qty: 21 TABLET | Refills: 0 | Status: SHIPPED | OUTPATIENT
Start: 2023-04-13 | End: 2023-04-23

## 2023-04-13 RX ORDER — OLMESARTAN MEDOXOMIL 20 MG/1
20 TABLET ORAL
Qty: 30 TABLET | Refills: 2 | Status: SHIPPED | OUTPATIENT
Start: 2023-04-13 | End: 2023-07-12

## 2023-04-13 RX ADMIN — KETOROLAC TROMETHAMINE 60 MG: 30 INJECTION, SOLUTION INTRAMUSCULAR; INTRAVENOUS at 14:56

## 2023-04-13 NOTE — ASSESSMENT & PLAN NOTE
Headaches are worsening.  Counseled regarding lifestyle modifications.  Medication changes per orders.

## 2023-04-13 NOTE — PROGRESS NOTES
"Chief Complaint  Headache (X 2 weeks;) and URI (Chest congestion (x 2 negative covid tests at home))    Subjective        Tobias Mariscal presents to [facility]   C/o an acute medical visit as his PCP:   Headache  Headache pattern:  Headache sometimes there, sometimes not at all  Initial event:  None  Recent changes:  Headaches come more often than they used to  Time of day symptoms are worse:  No specific time of day  Do headaches wake patient from sleep?: No    Days of the week symptoms are worse:  No specific day of the week  Quality:  Pounding and throbbing  Laterality:  Both sides at the same time  Location:  Front/forehead and top of head  URI   This is a new problem. The current episode started 1 to 4 weeks ago. The problem has been waxing and waning. There has been no fever. Associated symptoms include headaches and rhinorrhea. Pertinent negatives include no chest pain. The treatment provided mild relief.   Hypertension  This is a chronic problem. The current episode started more than 1 year ago. The problem has been gradually worsening since onset. The problem is uncontrolled. Associated symptoms include headaches. Pertinent negatives include no chest pain, palpitations or shortness of breath. Past treatments include lifestyle changes. Current antihypertension treatment includes lifestyle changes.       Objective   Vital Signs:  /78 (BP Location: Right arm, Patient Position: Sitting, Cuff Size: Adult)   Pulse 91   Temp 98 °F (36.7 °C) (Temporal)   Resp 20   Ht 175.3 cm (69\")   Wt 60.8 kg (134 lb)   SpO2 96%   BMI 19.79 kg/m²   Estimated body mass index is 19.79 kg/m² as calculated from the following:    Height as of this encounter: 175.3 cm (69\").    Weight as of this encounter: 60.8 kg (134 lb).    BMI is within normal parameters. No other follow-up for BMI required.      Physical Exam  Vitals and nursing note reviewed.   Constitutional:       Appearance: Normal " appearance.   HENT:      Head: Normocephalic.      Right Ear: Tympanic membrane normal.      Left Ear: Tympanic membrane normal.      Nose:      Right Sinus: Maxillary sinus tenderness present.      Left Sinus: Maxillary sinus tenderness present.      Mouth/Throat:      Mouth: Mucous membranes are moist.   Eyes:      Extraocular Movements: Extraocular movements intact.      Pupils: Pupils are equal, round, and reactive to light.   Cardiovascular:      Rate and Rhythm: Normal rate and regular rhythm.      Pulses: Normal pulses.      Heart sounds: Normal heart sounds.   Pulmonary:      Effort: Pulmonary effort is normal.      Breath sounds: Normal breath sounds.   Abdominal:      General: Abdomen is flat. Bowel sounds are normal.      Palpations: Abdomen is soft.   Musculoskeletal:         General: Normal range of motion.      Cervical back: Normal range of motion and neck supple.   Skin:     General: Skin is warm.      Capillary Refill: Capillary refill takes less than 2 seconds.   Neurological:      General: No focal deficit present.      Mental Status: He is alert and oriented to person, place, and time.   Psychiatric:         Mood and Affect: Mood normal.        Result Review :  The following data was reviewed by: Vasiliy Santos MD on 04/13/2023:  Common labs        6/20/2022    15:53 6/27/2022    11:19   Common Labs   Creatinine  0.70     WBC 36.20      Hemoglobin 13.6      Hematocrit 43.5      Platelets 451        Data reviewed: Radiologic studies 8/12/2022  ribs left         Assessment and Plan   Diagnoses and all orders for this visit:    1. Intractable migraine without aura and without status migrainosus (Primary)  Assessment & Plan:  Headaches are worsening.  Counseled regarding lifestyle modifications.  Medication changes per orders.          2. Primary hypertension  Assessment & Plan:  Hypertension is worsening.  Dietary sodium restriction.  Weight loss.  Regular aerobic exercise.  Stop  smoking.  Medication changes per orders.  Blood pressure will be reassessed at the next regular appointment.      3. Acute maxillary sinusitis, recurrence not specified         I spent 20 minutes caring for Tobias on this date of service. This time includes time spent by me in the following activities:reviewing tests, performing a medically appropriate examination and/or evaluation , counseling and educating the patient/family/caregiver, ordering medications, tests, or procedures and documenting information in the medical record       Follow Up   Return in about 3 months (around 7/13/2023) for Annual physical, RTC FASTING LABS & 1 month to ff/up new med for HTN.  Patient was given instructions and counseling regarding his condition or for health maintenance advice. Please see specific information pulled into the AVS if appropriate.           This document has been electronically signed by Vasiliy Santos MD  April 13, 2023 14:49 EDT

## 2023-04-26 ENCOUNTER — OFFICE VISIT (OUTPATIENT)
Dept: FAMILY MEDICINE CLINIC | Facility: CLINIC | Age: 43
End: 2023-04-26
Payer: COMMERCIAL

## 2023-04-26 VITALS
WEIGHT: 134 LBS | HEIGHT: 69 IN | SYSTOLIC BLOOD PRESSURE: 150 MMHG | BODY MASS INDEX: 19.85 KG/M2 | OXYGEN SATURATION: 97 % | HEART RATE: 94 BPM | DIASTOLIC BLOOD PRESSURE: 82 MMHG | TEMPERATURE: 98.2 F | RESPIRATION RATE: 18 BRPM

## 2023-04-26 DIAGNOSIS — J20.9 ACUTE BRONCHITIS, UNSPECIFIED ORGANISM: Primary | ICD-10-CM

## 2023-04-26 RX ORDER — CLARITHROMYCIN 500 MG/1
500 TABLET, COATED ORAL 2 TIMES DAILY
Qty: 20 TABLET | Refills: 0 | Status: SHIPPED | OUTPATIENT
Start: 2023-04-26 | End: 2023-05-06

## 2023-04-26 RX ORDER — GUAIFENESIN/DEXTROMETHORPHAN 100-10MG/5
10 SYRUP ORAL 3 TIMES DAILY PRN
Qty: 300 ML | Refills: 0 | Status: SHIPPED | OUTPATIENT
Start: 2023-04-26 | End: 2023-05-06

## 2023-04-26 RX ORDER — DEXAMETHASONE SODIUM PHOSPHATE 4 MG/ML
4 INJECTION, SOLUTION INTRA-ARTICULAR; INTRALESIONAL; INTRAMUSCULAR; INTRAVENOUS; SOFT TISSUE ONCE
Status: COMPLETED | OUTPATIENT
Start: 2023-04-26 | End: 2023-04-26

## 2023-04-26 RX ORDER — PREDNISONE 10 MG/1
10 TABLET ORAL 2 TIMES DAILY
Qty: 14 TABLET | Refills: 0 | Status: SHIPPED | OUTPATIENT
Start: 2023-04-26 | End: 2023-05-03

## 2023-04-26 RX ADMIN — DEXAMETHASONE SODIUM PHOSPHATE 4 MG: 4 INJECTION, SOLUTION INTRA-ARTICULAR; INTRALESIONAL; INTRAMUSCULAR; INTRAVENOUS; SOFT TISSUE at 15:25

## 2023-04-26 NOTE — LETTER
April 26, 2023     Patient: Tobias Mariscal   YOB: 1980   Date of Visit: 4/26/2023       To Whom It May Concern:    It is my medical opinion that Tobias Mariscal may return to work on 4/27/23 .           Sincerely,        Mio Joseph MD    CC:   No Recipients

## 2023-04-26 NOTE — PROGRESS NOTES
"Chief Complaint  Chest Congestion (States started a couple days ago - states no other symptoms other than the chest congestion. - Denies Flu / Sars testing./)    Subjective        Tobias Mariscal presents to [facility]  History of Present Illness  The patient is a 43 yo male who is her today because of cough with mild shortness of breath for 1 week. He states that he had fever a couple of days ago but it did not last very long. Denies any runny nose, congestion or sore throat.       Objective   Vital Signs:  /82   Pulse 94   Temp 98.2 °F (36.8 °C) (Temporal)   Resp 18   Ht 175.3 cm (69\")   Wt 60.8 kg (134 lb)   SpO2 97%   BMI 19.79 kg/m²   Estimated body mass index is 19.79 kg/m² as calculated from the following:    Height as of this encounter: 175.3 cm (69\").    Weight as of this encounter: 60.8 kg (134 lb).       BMI is within normal parameters. No other follow-up for BMI required.      Physical Exam  Vitals and nursing note reviewed.   Constitutional:       General: He is not in acute distress.     Appearance: Normal appearance. He is well-developed and well-groomed.   HENT:      Head: Normocephalic and atraumatic.      Jaw: No tenderness or pain on movement.      Salivary Glands: Right salivary gland is not diffusely enlarged. Left salivary gland is not diffusely enlarged.      Right Ear: Tympanic membrane and ear canal normal.      Left Ear: Tympanic membrane and ear canal normal.      Nose: No congestion or rhinorrhea.      Right Sinus: No maxillary sinus tenderness or frontal sinus tenderness.      Left Sinus: No maxillary sinus tenderness or frontal sinus tenderness.      Mouth/Throat:      Mouth: Mucous membranes are moist.      Pharynx: No oropharyngeal exudate or posterior oropharyngeal erythema.   Eyes:      General: Lids are normal. No allergic shiner or scleral icterus.     Conjunctiva/sclera: Conjunctivae normal.      Pupils: Pupils are equal, round, and " reactive to light.   Neck:      Thyroid: No thyroid mass, thyromegaly or thyroid tenderness.      Trachea: Trachea normal.   Cardiovascular:      Rate and Rhythm: Normal rate and regular rhythm.  No extrasystoles are present.     Pulses: Normal pulses.      Heart sounds: Normal heart sounds. No murmur heard.  Pulmonary:      Effort: Pulmonary effort is normal. No respiratory distress.      Breath sounds: Normal breath sounds. No decreased breath sounds or wheezing.      Comments: With bilateral coarse crackles and mild inspiratory wheezing.    Chest:   Breasts:     Right: Normal.      Left: Normal.   Abdominal:      General: Bowel sounds are normal.      Palpations: Abdomen is soft.      Tenderness: There is no abdominal tenderness. There is no right CVA tenderness, left CVA tenderness, guarding or rebound.   Musculoskeletal:      Cervical back: Neck supple.      Right lower leg: No edema.      Left lower leg: No edema.   Lymphadenopathy:      Cervical: No cervical adenopathy.      Upper Body:      Right upper body: No supraclavicular or axillary adenopathy.      Left upper body: No supraclavicular or axillary adenopathy.   Skin:     General: Skin is warm.      Nails: There is no clubbing.   Neurological:      General: No focal deficit present.      Mental Status: He is alert and oriented to person, place, and time.      Sensory: Sensation is intact.      Motor: Motor function is intact.      Coordination: Coordination is intact.   Psychiatric:         Attention and Perception: Attention and perception normal.         Mood and Affect: Mood normal.         Speech: Speech normal.         Behavior: Behavior normal. Behavior is cooperative.         Thought Content: Thought content normal.        Result Review :                   Assessment and Plan   Diagnoses and all orders for this visit:    1. Acute bronchitis, unspecified organism (Primary)  -     ProAir  (90 Base) MCG/ACT inhaler; Inhale 2 puffs Every 6  (Six) Hours As Needed for Wheezing for up to 30 days.  Dispense: 8 g; Refill: 0  -     dexamethasone (DECADRON) injection 4 mg  -     predniSONE (DELTASONE) 10 MG tablet; Take 1 tablet by mouth 2 (Two) Times a Day for 7 days.  Dispense: 14 tablet; Refill: 0  -     guaiFENesin-dextromethorphan (ROBITUSSIN DM) 100-10 MG/5ML syrup; Take 10 mL by mouth 3 (Three) Times a Day As Needed for Cough for up to 10 days.  Dispense: 300 mL; Refill: 0  -     clarithromycin (BIAXIN) 500 MG tablet; Take 1 tablet by mouth 2 (Two) Times a Day for 10 days.  Dispense: 20 tablet; Refill: 0           I spent 15 minutes caring for Tobias on this date of service. This time includes time spent by me in the following activities:preparing for the visit, performing a medically appropriate examination and/or evaluation , counseling and educating the patient/family/caregiver, ordering medications, tests, or procedures and documenting information in the medical record       Follow Up   Return if symptoms worsen or fail to improve.  Patient was given instructions and counseling regarding his condition or for health maintenance advice. Please see specific information pulled into the AVS if appropriate.     The patient is advised to continue all of his regular medications as prescribed. He was counseled regarding the importance of diet, exercise and medication compliance.    The patient was advised rest, increase oral fluids, may take Tylenol or ibuprofen as needed.  Return to the clinic if symptoms worsen or fail to improve.      This document has been electronically signed by Mio Joseph MD  April 26, 2023 15:53 EDT

## 2023-05-04 ENCOUNTER — OFFICE VISIT (OUTPATIENT)
Dept: FAMILY MEDICINE CLINIC | Facility: CLINIC | Age: 43
End: 2023-05-04
Payer: COMMERCIAL

## 2023-05-04 ENCOUNTER — HOSPITAL ENCOUNTER (OUTPATIENT)
Dept: CT IMAGING | Facility: HOSPITAL | Age: 43
End: 2023-05-04
Payer: COMMERCIAL

## 2023-05-04 VITALS
HEIGHT: 69 IN | HEART RATE: 87 BPM | TEMPERATURE: 97.6 F | RESPIRATION RATE: 18 BRPM | SYSTOLIC BLOOD PRESSURE: 148 MMHG | BODY MASS INDEX: 20.14 KG/M2 | WEIGHT: 136 LBS | DIASTOLIC BLOOD PRESSURE: 88 MMHG | OXYGEN SATURATION: 99 %

## 2023-05-04 DIAGNOSIS — G44.89 OTHER HEADACHE SYNDROME: ICD-10-CM

## 2023-05-04 DIAGNOSIS — G44.89 OTHER HEADACHE SYNDROME: Primary | ICD-10-CM

## 2023-05-04 PROBLEM — U07.1 COVID-19: Status: ACTIVE | Noted: 2023-05-04

## 2023-05-04 PROBLEM — J44.9 COPD (CHRONIC OBSTRUCTIVE PULMONARY DISEASE): Status: ACTIVE | Noted: 2023-05-04

## 2023-05-04 PROBLEM — R20.0 RIGHT ARM NUMBNESS: Status: ACTIVE | Noted: 2023-05-04

## 2023-05-04 PROBLEM — J84.10 PULMONARY FIBROSIS: Status: ACTIVE | Noted: 2023-05-04

## 2023-05-04 LAB
ALBUMIN SERPL-MCNC: 3.7 G/DL (ref 3.5–5.2)
ALBUMIN/GLOB SERPL: 1.2 G/DL
ALP SERPL-CCNC: 124 U/L (ref 39–117)
ALT SERPL W P-5'-P-CCNC: 22 U/L (ref 1–41)
ANION GAP SERPL CALCULATED.3IONS-SCNC: 8.2 MMOL/L (ref 5–15)
AST SERPL-CCNC: 19 U/L (ref 1–40)
BASOPHILS # BLD AUTO: 0.11 10*3/MM3 (ref 0–0.2)
BASOPHILS NFR BLD AUTO: 0.5 % (ref 0–1.5)
BILIRUB SERPL-MCNC: 0.4 MG/DL (ref 0–1.2)
BUN SERPL-MCNC: 12 MG/DL (ref 6–20)
BUN/CREAT SERPL: 17.6 (ref 7–25)
CALCIUM SPEC-SCNC: 8.4 MG/DL (ref 8.6–10.5)
CHLORIDE SERPL-SCNC: 101 MMOL/L (ref 98–107)
CHOLEST SERPL-MCNC: 134 MG/DL (ref 0–200)
CO2 SERPL-SCNC: 27.8 MMOL/L (ref 22–29)
CREAT SERPL-MCNC: 0.68 MG/DL (ref 0.76–1.27)
DEPRECATED RDW RBC AUTO: 50.9 FL (ref 37–54)
EGFRCR SERPLBLD CKD-EPI 2021: 119.8 ML/MIN/1.73
EOSINOPHIL # BLD AUTO: 0.5 10*3/MM3 (ref 0–0.4)
EOSINOPHIL NFR BLD AUTO: 2.2 % (ref 0.3–6.2)
ERYTHROCYTE [DISTWIDTH] IN BLOOD BY AUTOMATED COUNT: 13.5 % (ref 12.3–15.4)
GLOBULIN UR ELPH-MCNC: 3.1 GM/DL
GLUCOSE SERPL-MCNC: 80 MG/DL (ref 65–99)
HCT VFR BLD AUTO: 43.9 % (ref 37.5–51)
HDLC SERPL-MCNC: 56 MG/DL (ref 40–60)
HGB BLD-MCNC: 14.2 G/DL (ref 13–17.7)
IMM GRANULOCYTES # BLD AUTO: 0.11 10*3/MM3 (ref 0–0.05)
IMM GRANULOCYTES NFR BLD AUTO: 0.5 % (ref 0–0.5)
LDLC SERPL CALC-MCNC: 62 MG/DL (ref 0–100)
LDLC/HDLC SERPL: 1.09 {RATIO}
LYMPHOCYTES # BLD AUTO: 2.55 10*3/MM3 (ref 0.7–3.1)
LYMPHOCYTES NFR BLD AUTO: 11.4 % (ref 19.6–45.3)
MCH RBC QN AUTO: 32.8 PG (ref 26.6–33)
MCHC RBC AUTO-ENTMCNC: 32.3 G/DL (ref 31.5–35.7)
MCV RBC AUTO: 101.4 FL (ref 79–97)
MONOCYTES # BLD AUTO: 1.69 10*3/MM3 (ref 0.1–0.9)
MONOCYTES NFR BLD AUTO: 7.6 % (ref 5–12)
NEUTROPHILS NFR BLD AUTO: 17.38 10*3/MM3 (ref 1.7–7)
NEUTROPHILS NFR BLD AUTO: 77.8 % (ref 42.7–76)
NRBC BLD AUTO-RTO: 0 /100 WBC (ref 0–0.2)
PLATELET # BLD AUTO: 456 10*3/MM3 (ref 140–450)
PMV BLD AUTO: 10.5 FL (ref 6–12)
POTASSIUM SERPL-SCNC: 4.7 MMOL/L (ref 3.5–5.2)
PROT SERPL-MCNC: 6.8 G/DL (ref 6–8.5)
RBC # BLD AUTO: 4.33 10*6/MM3 (ref 4.14–5.8)
SODIUM SERPL-SCNC: 137 MMOL/L (ref 136–145)
TRIGL SERPL-MCNC: 86 MG/DL (ref 0–150)
TSH SERPL DL<=0.05 MIU/L-ACNC: 1.9 UIU/ML (ref 0.27–4.2)
VLDLC SERPL-MCNC: 16 MG/DL (ref 5–40)
WBC NRBC COR # BLD: 22.34 10*3/MM3 (ref 3.4–10.8)

## 2023-05-04 PROCEDURE — 99213 OFFICE O/P EST LOW 20 MIN: CPT | Performed by: PSYCHOLOGIST

## 2023-05-04 PROCEDURE — 3079F DIAST BP 80-89 MM HG: CPT | Performed by: PSYCHOLOGIST

## 2023-05-04 PROCEDURE — 80061 LIPID PANEL: CPT | Performed by: PSYCHOLOGIST

## 2023-05-04 PROCEDURE — 3077F SYST BP >= 140 MM HG: CPT | Performed by: PSYCHOLOGIST

## 2023-05-04 PROCEDURE — 1160F RVW MEDS BY RX/DR IN RCRD: CPT | Performed by: PSYCHOLOGIST

## 2023-05-04 PROCEDURE — 82607 VITAMIN B-12: CPT | Performed by: PSYCHOLOGIST

## 2023-05-04 PROCEDURE — 1159F MED LIST DOCD IN RCRD: CPT | Performed by: PSYCHOLOGIST

## 2023-05-04 PROCEDURE — 80050 GENERAL HEALTH PANEL: CPT | Performed by: PSYCHOLOGIST

## 2023-05-04 PROCEDURE — 82306 VITAMIN D 25 HYDROXY: CPT | Performed by: PSYCHOLOGIST

## 2023-05-04 RX ORDER — OXYCODONE HYDROCHLORIDE AND ACETAMINOPHEN 5; 325 MG/1; MG/1
1 TABLET ORAL EVERY 6 HOURS PRN
Qty: 10 TABLET | Refills: 0 | Status: SHIPPED | OUTPATIENT
Start: 2023-05-04 | End: 2023-05-09

## 2023-05-04 RX ORDER — KETOROLAC TROMETHAMINE 30 MG/ML
30 INJECTION, SOLUTION INTRAMUSCULAR; INTRAVENOUS ONCE
Status: DISCONTINUED | OUTPATIENT
Start: 2023-05-04 | End: 2023-05-04

## 2023-05-04 RX ORDER — KETOROLAC TROMETHAMINE 30 MG/ML
60 INJECTION, SOLUTION INTRAMUSCULAR; INTRAVENOUS ONCE
Status: COMPLETED | OUTPATIENT
Start: 2023-05-04 | End: 2023-05-04

## 2023-05-04 RX ADMIN — KETOROLAC TROMETHAMINE 60 MG: 30 INJECTION, SOLUTION INTRAMUSCULAR; INTRAVENOUS at 15:49

## 2023-05-04 NOTE — PROGRESS NOTES
"Chief Complaint  Headache (& Fatigue. States that headaches have gotten worse causing him to be run down./)    Subjective        Tobias Mariscal presents to [facility]   C/O AN ACUTE MEDICAL VISIT AS HIS PCP:   Headache  Headache pattern:  Headache sometimes there, sometimes not at all  Initial event:  None  Recent changes:  Headaches come more often than they used to  Number of ER visits for headache:  0  Number of hospitalizations for headaches:  0  Time of day symptoms are worse:  No specific time of day  Days of the week symptoms are worse:  No specific day of the week  Season symptoms are worse:  No particular season  Quality:  Pounding and pressure pushing out  Location:  Front/forehead  Escalation timing:  Wakes up with severe pain      Objective   Vital Signs:  /88 (BP Location: Right arm, Patient Position: Sitting, Cuff Size: Adult)   Pulse 87   Temp 97.6 °F (36.4 °C) (Temporal)   Resp 18   Ht 175.3 cm (69\")   Wt 61.7 kg (136 lb)   SpO2 99%   BMI 20.08 kg/m²   Estimated body mass index is 20.08 kg/m² as calculated from the following:    Height as of this encounter: 175.3 cm (69\").    Weight as of this encounter: 61.7 kg (136 lb).    BMI is within normal parameters. No other follow-up for BMI required.      Physical Exam  Vitals and nursing note reviewed.   Constitutional:       Appearance: Normal appearance.   HENT:      Head: Normocephalic.      Right Ear: Tympanic membrane normal.      Left Ear: Tympanic membrane normal.      Nose: Nose normal.      Mouth/Throat:      Mouth: Mucous membranes are moist.   Eyes:      Extraocular Movements: Extraocular movements intact.      Pupils: Pupils are equal, round, and reactive to light.   Cardiovascular:      Rate and Rhythm: Normal rate and regular rhythm.      Pulses: Normal pulses.      Heart sounds: Normal heart sounds.   Pulmonary:      Effort: Pulmonary effort is normal.      Breath sounds: Normal breath sounds. "   Abdominal:      General: Abdomen is flat. Bowel sounds are normal.      Palpations: Abdomen is soft.   Musculoskeletal:         General: Normal range of motion.      Cervical back: Normal range of motion and neck supple.   Skin:     General: Skin is warm.      Capillary Refill: Capillary refill takes less than 2 seconds.   Neurological:      General: No focal deficit present.      Mental Status: He is alert and oriented to person, place, and time.   Psychiatric:         Mood and Affect: Mood normal.        Result Review :  The following data was reviewed by: Vasiliy Santos MD on 05/04/2023:  Common labs        6/20/2022    15:53 6/27/2022    11:19   Common Labs   Creatinine  0.70     WBC 36.20      Hemoglobin 13.6      Hematocrit 43.5      Platelets 451               Assessment and Plan   Diagnoses and all orders for this visit:    1. Other headache syndrome (Primary)  Comments:  HE REPORTS WOKE UP AND COULD NOT WORK FEELS LIKE THE WORST HEADACHE  HE TOOK TYLENOL NO RELIEF   Orders:  -     CT Head Without Contrast; Future  -     Lipid Panel  -     Vitamin B12  -     Vitamin D,25-Hydroxy  -     ketorolac (TORADOL) injection 30 mg  -     oxyCODONE-acetaminophen (Percocet) 5-325 MG per tablet; Take 1 tablet by mouth Every 6 (Six) Hours As Needed for Severe Pain for up to 5 days.  Dispense: 10 tablet; Refill: 0      I spent 20 minutes caring for Tobias on this date of service. This time includes time spent by me in the following activities:reviewing tests, performing a medically appropriate examination and/or evaluation , counseling and educating the patient/family/caregiver, ordering medications, tests, or procedures and documenting information in the medical record     TRIAGED TO [facility] ER BUT THEY ARE FULL WITH 3 EMS RLING IN/ CALLED FOR CT TO SCHED AT [facility] BUT TECH JUST LEFT. WILL HAVE STAFF CALL IMAGING CENTER        Follow Up   Return if symptoms worsen or fail to improve/.  Patient was given instructions  and counseling regarding his condition or for health maintenance advice. Please see specific information pulled into the AVS if appropriate.           This document has been electronically signed by Vasiliy Santso MD  May 4, 2023 15:33 EDT

## 2023-05-05 LAB
25(OH)D3 SERPL-MCNC: 11.6 NG/ML (ref 30–100)
VIT B12 BLD-MCNC: 1621 PG/ML (ref 211–946)

## 2023-05-08 ENCOUNTER — TELEPHONE (OUTPATIENT)
Dept: FAMILY MEDICINE CLINIC | Facility: CLINIC | Age: 43
End: 2023-05-08
Payer: COMMERCIAL

## 2023-05-18 DIAGNOSIS — D72.820 LYMPHOCYTOSIS: Primary | ICD-10-CM

## 2023-05-18 DIAGNOSIS — D72.829 LEUKOCYTOSIS, UNSPECIFIED TYPE: ICD-10-CM

## 2023-05-18 DIAGNOSIS — G89.4 CHRONIC PAIN SYNDROME: ICD-10-CM

## 2023-05-18 DIAGNOSIS — R05.1 ACUTE COUGH: ICD-10-CM

## 2023-05-18 RX ORDER — OXYCODONE HYDROCHLORIDE AND ACETAMINOPHEN 5; 325 MG/1; MG/1
1 TABLET ORAL EVERY 6 HOURS PRN
Qty: 10 TABLET | Refills: 0 | Status: SHIPPED | OUTPATIENT
Start: 2023-05-18 | End: 2023-05-28

## 2023-05-18 RX ORDER — GUAIFENESIN AND CODEINE PHOSPHATE 100; 10 MG/5ML; MG/5ML
5 SOLUTION ORAL 3 TIMES DAILY PRN
Qty: 150 ML | Refills: 0 | Status: SHIPPED | OUTPATIENT
Start: 2023-05-18 | End: 2023-05-28

## 2023-05-18 RX ORDER — LEVOFLOXACIN 250 MG/1
250 TABLET ORAL DAILY
Qty: 10 TABLET | Refills: 0 | Status: SHIPPED | OUTPATIENT
Start: 2023-05-18 | End: 2023-05-28

## 2023-05-26 ENCOUNTER — TELEPHONE (OUTPATIENT)
Dept: FAMILY MEDICINE CLINIC | Facility: CLINIC | Age: 43
End: 2023-05-26
Payer: COMMERCIAL

## 2023-05-26 NOTE — TELEPHONE ENCOUNTER
Attempted to contact patient to reschedule previous missed appointment on 5/26/23    HUB okay to reschedule appointment at patients earliest convenience.    Fany Burton, [employer] Rep

## 2023-06-12 ENCOUNTER — TELEPHONE (OUTPATIENT)
Dept: FAMILY MEDICINE CLINIC | Facility: CLINIC | Age: 43
End: 2023-06-12

## 2023-06-12 DIAGNOSIS — G44.321 INTRACTABLE CHRONIC POST-TRAUMATIC HEADACHE: Primary | ICD-10-CM

## 2023-06-12 RX ORDER — OXYCODONE HYDROCHLORIDE AND ACETAMINOPHEN 5; 325 MG/1; MG/1
1 TABLET ORAL EVERY 6 HOURS PRN
Qty: 12 TABLET | Refills: 0 | Status: SHIPPED | OUTPATIENT
Start: 2023-06-12 | End: 2023-06-15 | Stop reason: SDUPTHER
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
## 2023-06-14 ENCOUNTER — OFFICE VISIT (OUTPATIENT)
Dept: FAMILY MEDICINE CLINIC | Facility: CLINIC | Age: 43
End: 2023-06-14
Payer: COMMERCIAL

## 2023-06-14 VITALS
OXYGEN SATURATION: 99 % | BODY MASS INDEX: 19.11 KG/M2 | RESPIRATION RATE: 18 BRPM | DIASTOLIC BLOOD PRESSURE: 84 MMHG | HEIGHT: 69 IN | SYSTOLIC BLOOD PRESSURE: 162 MMHG | TEMPERATURE: 97.6 F | WEIGHT: 129 LBS | HEART RATE: 87 BPM

## 2023-06-14 DIAGNOSIS — R10.11 RIGHT UPPER QUADRANT ABDOMINAL PAIN: Primary | ICD-10-CM

## 2023-06-14 PROCEDURE — 3077F SYST BP >= 140 MM HG: CPT | Performed by: NURSE PRACTITIONER

## 2023-06-14 PROCEDURE — 99213 OFFICE O/P EST LOW 20 MIN: CPT | Performed by: NURSE PRACTITIONER

## 2023-06-14 PROCEDURE — 1160F RVW MEDS BY RX/DR IN RCRD: CPT | Performed by: NURSE PRACTITIONER

## 2023-06-14 PROCEDURE — 1159F MED LIST DOCD IN RCRD: CPT | Performed by: NURSE PRACTITIONER

## 2023-06-14 PROCEDURE — 3079F DIAST BP 80-89 MM HG: CPT | Performed by: NURSE PRACTITIONER

## 2023-06-14 NOTE — PROGRESS NOTES
"Chief Complaint  Abdominal Pain (Right side abdominal pain./& Testicular soreness./Avoiding ER because he doesn't trust the hospital- took 3 years to trust Dr. Santos/)    Subjective        Tobias Mariscal presents to [facility] as a primary care pt of Dr. Santos's for acute care (RUQ abd pain).    Abdominal Pain  This is a new problem. Episode onset: Mon afternoon (2 days ago) The onset quality is sudden. The problem occurs constantly. The problem has been rapidly worsening. Pain location: right side of abd (\"rib area down to hip bone\" The pain is at a severity of 9/10. The quality of the pain is sharp and aching. The abdominal pain does not radiate. Associated symptoms include belching, headaches and nausea. Pertinent negatives include no anorexia, arthralgias, constipation, diarrhea, dysuria, fever, flatus, frequency, hematochezia, hematuria, melena, myalgias, vomiting or weight loss. Associated symptoms comments: Scrotal pain per pt. The pain is aggravated by being still. The pain is relieved by Certain positions. Treatments tried: oxycodone; ibuprofen 800 mg. The treatment provided no relief.     Objective   Vital Signs:  /84   Pulse 87   Temp 97.6 °F (36.4 °C) (Temporal)   Resp 18   Ht 175.3 cm (69\")   Wt 58.5 kg (129 lb)   SpO2 99%   BMI 19.05 kg/m²   Estimated body mass index is 19.05 kg/m² as calculated from the following:    Height as of this encounter: 175.3 cm (69\").    Weight as of this encounter: 58.5 kg (129 lb).       BMI is within normal parameters. No other follow-up for BMI required.    Physical Exam  Vitals and nursing note reviewed.   Constitutional:       General: He is awake.      Appearance: He is underweight.   HENT:      Head: Normocephalic.      Right Ear: Hearing and external ear normal.      Left Ear: Hearing and external ear normal.      Nose: Nose normal.      Mouth/Throat:      Lips: Pink.      Mouth: Mucous membranes are moist.   Eyes:      " "General: Lids are normal.      Conjunctiva/sclera: Conjunctivae normal.      Pupils: Pupils are equal, round, and reactive to light.   Cardiovascular:      Rate and Rhythm: Normal rate and regular rhythm.      Heart sounds: Normal heart sounds.   Pulmonary:      Effort: Pulmonary effort is normal.      Breath sounds: Normal breath sounds.   Abdominal:      General: Abdomen is flat. Bowel sounds are normal.      Palpations: Abdomen is soft.      Tenderness: There is abdominal tenderness in the right upper quadrant and right lower quadrant. There is guarding.   Musculoskeletal:         General: Normal range of motion.      Cervical back: Normal range of motion.   Skin:     General: Skin is warm and dry.      Capillary Refill: Capillary refill takes less than 2 seconds.   Neurological:      Mental Status: He is alert and oriented to person, place, and time.      Sensory: Sensation is intact.      Motor: Motor function is intact.      Coordination: Coordination is intact.      Gait: Gait is intact.   Psychiatric:         Attention and Perception: Attention and perception normal.         Mood and Affect: Affect normal. Mood is anxious.         Speech: Speech is rapid and pressured.         Behavior: Behavior is agitated. Behavior is cooperative.         Thought Content: Thought content normal.        Result Review :  The following data was reviewed by: WOODROW Sahu on 06/14/2023:    Pt refuses necessity of outpatient imaging (ultrasound/x-ray) at this time.    Assessment and Plan   Diagnoses and all orders for this visit:    1. Right upper quadrant abdominal pain (Primary)  Comments:  Pt advised to go to ER for further evaluation and treatment of \"extreme RUQ abd pain.\"         I spent 20 minutes caring for Tobias on this date of service. This time includes time spent by me in the following activities:preparing for the visit, reviewing tests, obtaining and/or reviewing a separately obtained history, performing a " medically appropriate examination and/or evaluation , counseling and educating the patient/family/caregiver, ordering medications, tests, or procedures, referring and communicating with other health care professionals , documenting information in the medical record, independently interpreting results and communicating that information with the patient/family/caregiver, and care coordination    Follow Up   Return for ER with any worsening symptoms for further imaging/eval/treatment.  Patient was given instructions and counseling regarding his condition or for health maintenance advice. Please see specific information pulled into the AVS if appropriate.       This document has been electronically signed by WOODROW Sahu  June 14, 2023 14:03 EDT

## 2023-06-15 ENCOUNTER — OFFICE VISIT (OUTPATIENT)
Dept: FAMILY MEDICINE CLINIC | Facility: CLINIC | Age: 43
End: 2023-06-15
Payer: COMMERCIAL

## 2023-06-15 VITALS
WEIGHT: 127.2 LBS | OXYGEN SATURATION: 96 % | RESPIRATION RATE: 20 BRPM | SYSTOLIC BLOOD PRESSURE: 134 MMHG | BODY MASS INDEX: 18.84 KG/M2 | DIASTOLIC BLOOD PRESSURE: 82 MMHG | TEMPERATURE: 98.7 F | HEART RATE: 119 BPM | HEIGHT: 69 IN

## 2023-06-15 DIAGNOSIS — R10.31 GROIN PAIN, RIGHT: ICD-10-CM

## 2023-06-15 DIAGNOSIS — G44.321 INTRACTABLE CHRONIC POST-TRAUMATIC HEADACHE: ICD-10-CM

## 2023-06-15 DIAGNOSIS — N20.0 KIDNEY STONES: Primary | ICD-10-CM

## 2023-06-15 PROCEDURE — 3079F DIAST BP 80-89 MM HG: CPT | Performed by: PSYCHOLOGIST

## 2023-06-15 PROCEDURE — 99213 OFFICE O/P EST LOW 20 MIN: CPT | Performed by: PSYCHOLOGIST

## 2023-06-15 PROCEDURE — 1160F RVW MEDS BY RX/DR IN RCRD: CPT | Performed by: PSYCHOLOGIST

## 2023-06-15 PROCEDURE — 1159F MED LIST DOCD IN RCRD: CPT | Performed by: PSYCHOLOGIST

## 2023-06-15 PROCEDURE — 3075F SYST BP GE 130 - 139MM HG: CPT | Performed by: PSYCHOLOGIST

## 2023-06-15 RX ORDER — OXYCODONE HYDROCHLORIDE AND ACETAMINOPHEN 5; 325 MG/1; MG/1
1 TABLET ORAL 3 TIMES DAILY
Qty: 30 TABLET | Refills: 0 | Status: SHIPPED | OUTPATIENT
Start: 2023-06-15 | End: 2023-06-25

## 2023-06-15 RX ORDER — POLYETHYLENE GLYCOL 3350 17 G/17G
17 POWDER, FOR SOLUTION ORAL DAILY
COMMUNITY
Start: 2023-06-14

## 2023-06-15 RX ORDER — DOXYCYCLINE HYCLATE 100 MG/1
100 CAPSULE ORAL 2 TIMES DAILY
Qty: 28 CAPSULE | Refills: 0 | Status: SHIPPED | OUTPATIENT
Start: 2023-06-15 | End: 2023-06-29

## 2023-06-15 RX ORDER — DICYCLOMINE HCL 20 MG
20 TABLET ORAL EVERY 6 HOURS
COMMUNITY
Start: 2023-06-14

## 2023-06-15 RX ORDER — ONDANSETRON 4 MG/1
4 TABLET, ORALLY DISINTEGRATING ORAL EVERY 8 HOURS PRN
COMMUNITY
Start: 2023-06-14

## 2023-06-15 RX ORDER — TAMSULOSIN HYDROCHLORIDE 0.4 MG/1
1 CAPSULE ORAL DAILY
Qty: 30 CAPSULE | Refills: 0 | Status: SHIPPED | OUTPATIENT
Start: 2023-06-15 | End: 2023-07-15

## 2023-06-15 RX ORDER — KETOROLAC TROMETHAMINE 30 MG/ML
60 INJECTION, SOLUTION INTRAMUSCULAR; INTRAVENOUS ONCE
Status: COMPLETED | OUTPATIENT
Start: 2023-06-15 | End: 2023-06-15

## 2023-06-15 RX ADMIN — KETOROLAC TROMETHAMINE 60 MG: 30 INJECTION, SOLUTION INTRAMUSCULAR; INTRAVENOUS at 14:29

## 2023-06-15 NOTE — PROGRESS NOTES
"Chief Complaint  Groin Swelling (Since Monday (also had pain in RLQ); Seen in ER yesterday at [place] , CT scan done, u/a - kidney stones seen, meds ordered but has just picked up this AM)    Subjective        Tobias Mariscal presents to [facility]  Abdominal Pain  This is a new problem. The current episode started 1 to 4 weeks ago. The problem occurs constantly. The problem has been gradually worsening. The pain is located in the right flank. The pain is at a severity of 8/10. The pain is moderate. The quality of the pain is colicky and sharp. The abdominal pain radiates to the right flank. Associated symptoms include nausea. Pertinent negatives include no fever. He has tried nothing for the symptoms. The treatment provided mild relief.   Groin Pain  The patient's primary symptoms include testicular pain. This is a new problem. The current episode started yesterday. The problem has been gradually worsening. The pain is moderate. Associated symptoms include abdominal pain and nausea. Pertinent negatives include no fever. The testicular pain affects the right testicle. There is swelling in the right testicle. He is sexually active. His past medical history is significant for kidney stones.     Objective   Vital Signs:  /82 (BP Location: Right arm, Patient Position: Sitting, Cuff Size: Adult)   Pulse 119   Temp 98.7 °F (37.1 °C) (Temporal)   Resp 20   Ht 175.3 cm (69\")   Wt 57.7 kg (127 lb 3.2 oz)   SpO2 96%   BMI 18.78 kg/m²   Estimated body mass index is 18.78 kg/m² as calculated from the following:    Height as of this encounter: 175.3 cm (69\").    Weight as of this encounter: 57.7 kg (127 lb 3.2 oz).    BMI is within normal parameters. No other follow-up for BMI required.      Physical Exam  Vitals and nursing note reviewed.   Constitutional:       Appearance: Normal appearance.   HENT:      Head: Normocephalic.      Right Ear: Tympanic membrane normal.      Left Ear: " "Tympanic membrane normal.      Nose: Nose normal.      Mouth/Throat:      Mouth: Mucous membranes are moist.   Eyes:      Extraocular Movements: Extraocular movements intact.      Pupils: Pupils are equal, round, and reactive to light.   Cardiovascular:      Rate and Rhythm: Normal rate and regular rhythm.      Pulses: Normal pulses.      Heart sounds: Normal heart sounds.   Pulmonary:      Effort: Pulmonary effort is normal.      Breath sounds: Normal breath sounds.   Abdominal:      General: Abdomen is flat. Bowel sounds are normal.      Palpations: Abdomen is soft.   Musculoskeletal:         General: Normal range of motion.      Cervical back: Normal range of motion and neck supple.   Skin:     General: Skin is warm.      Capillary Refill: Capillary refill takes less than 2 seconds.   Neurological:      General: No focal deficit present.      Mental Status: He is alert and oriented to person, place, and time.   Psychiatric:         Mood and Affect: Mood normal.      Result Review :  The following data was reviewed by: Vasiliy Santos MD on 06/15/2023:  Common labs          6/27/2022    11:19 5/4/2023    15:42   Common Labs   Glucose  80    BUN  12    Creatinine 0.70  0.68    Sodium  137    Potassium  4.7    Chloride  101    Calcium  8.4    Albumin  3.7    Total Bilirubin  0.4    Alkaline Phosphatase  124    AST (SGOT)  19    ALT (SGPT)  22    WBC  22.34    Hemoglobin  14.2    Hematocrit  43.9    Platelets  456    Total Cholesterol  134    Triglycerides  86    HDL Cholesterol  56    LDL Cholesterol   62      Data reviewed : Radiologic studies 5/31/23 CT Head            Assessment and Plan   Diagnoses and all orders for this visit:    1. Kidney stones (Primary)  Comments:  Right side . seen in ER yesterday and still in severe pain / CT done showed stiones and   also infection in his groin area  Orders:  -     ketorolac (TORADOL) injection 60 mg    2. Groin pain, right  Comments:  He was told of \"infection \" " in ER / but was not gicven william poo antibiotics  Orders:  -     ketorolac (TORADOL) injection 60 mg    3. Intractable chronic post-traumatic headache  -     oxyCODONE-acetaminophen (Percocet) 5-325 MG per tablet; Take 1 tablet by mouth 3 (Three) Times a Day for 10 days.  Dispense: 30 tablet; Refill: 0    Other orders  -     doxycycline (VIBRAMYCIN) 100 MG capsule; Take 1 capsule by mouth 2 (Two) Times a Day for 14 days.  Dispense: 28 capsule; Refill: 0  -     tamsulosin (FLOMAX) 0.4 MG capsule 24 hr capsule; Take 1 capsule by mouth Daily for 30 days.  Dispense: 30 capsule; Refill: 0           I spent 20 minutes caring for Tobias on this date of service. This time includes time spent by me in the following activities:reviewing tests, obtaining and/or reviewing a separately obtained history, performing a medically appropriate examination and/or evaluation , counseling and educating the patient/family/caregiver, ordering medications, tests, or procedures, and documenting information in the medical record       Follow Up   Return if symptoms worsen or fail to improve/RTC. ER.  Patient was given instructions and counseling regarding his condition or for health maintenance advice. Please see specific information pulled into the AVS if appropriate.           This document has been electronically signed by Vasiliy Santos MD  Ivon 15, 2023 14:18 EDT

## 2023-12-06 ENCOUNTER — DOCUMENTATION (OUTPATIENT)
Dept: FAMILY MEDICINE CLINIC | Facility: CLINIC | Age: 43
End: 2023-12-06
Payer: COMMERCIAL

## 2023-12-06 RX ORDER — DOXYCYCLINE HYCLATE 100 MG/1
100 CAPSULE ORAL 2 TIMES DAILY
Qty: 28 CAPSULE | Refills: 0 | Status: SHIPPED | OUTPATIENT
Start: 2023-12-06 | End: 2023-12-20

## 2024-03-19 ENCOUNTER — OFFICE VISIT (OUTPATIENT)
Dept: FAMILY MEDICINE CLINIC | Facility: CLINIC | Age: 44
End: 2024-03-19
Payer: COMMERCIAL

## 2024-03-19 VITALS
HEIGHT: 69 IN | WEIGHT: 136.2 LBS | BODY MASS INDEX: 20.17 KG/M2 | OXYGEN SATURATION: 97 % | DIASTOLIC BLOOD PRESSURE: 80 MMHG | SYSTOLIC BLOOD PRESSURE: 140 MMHG | HEART RATE: 96 BPM

## 2024-03-19 DIAGNOSIS — R09.81 NASAL CONGESTION: ICD-10-CM

## 2024-03-19 DIAGNOSIS — R09.89 CHEST CONGESTION: Primary | ICD-10-CM

## 2024-03-19 LAB
EXPIRATION DATE: NORMAL
FLUAV AG UPPER RESP QL IA.RAPID: NOT DETECTED
FLUBV AG UPPER RESP QL IA.RAPID: NOT DETECTED
INTERNAL CONTROL: NORMAL
Lab: NORMAL
SARS-COV-2 AG UPPER RESP QL IA.RAPID: NOT DETECTED

## 2024-03-19 PROCEDURE — 87428 SARSCOV & INF VIR A&B AG IA: CPT | Performed by: PSYCHOLOGIST

## 2024-03-19 PROCEDURE — 99213 OFFICE O/P EST LOW 20 MIN: CPT | Performed by: PSYCHOLOGIST

## 2024-03-19 RX ORDER — DEXAMETHASONE SODIUM PHOSPHATE 4 MG/ML
8 INJECTION, SOLUTION INTRA-ARTICULAR; INTRALESIONAL; INTRAMUSCULAR; INTRAVENOUS; SOFT TISSUE ONCE
Status: COMPLETED | OUTPATIENT
Start: 2024-03-19 | End: 2024-03-19

## 2024-03-19 RX ORDER — BROMPHENIRAMINE MALEATE, PSEUDOEPHEDRINE HYDROCHLORIDE, AND DEXTROMETHORPHAN HYDROBROMIDE 2; 30; 10 MG/5ML; MG/5ML; MG/5ML
5 SYRUP ORAL 3 TIMES DAILY PRN
Qty: 150 ML | Refills: 0 | Status: SHIPPED | OUTPATIENT
Start: 2024-03-19 | End: 2024-03-29

## 2024-03-19 RX ORDER — AZITHROMYCIN 250 MG/1
TABLET, FILM COATED ORAL
Qty: 6 TABLET | Refills: 1 | Status: SHIPPED | OUTPATIENT
Start: 2024-03-19

## 2024-03-19 RX ADMIN — DEXAMETHASONE SODIUM PHOSPHATE 8 MG: 4 INJECTION, SOLUTION INTRA-ARTICULAR; INTRALESIONAL; INTRAMUSCULAR; INTRAVENOUS; SOFT TISSUE at 15:56

## 2024-03-19 NOTE — PROGRESS NOTES
"Chief Complaint  Nasal Congestion (And chest congestion started over the weekend , headaches , fatigue - no fever  )    Subjective        Tobias Mariscal presents to [facility]  C/o an acute visit as his PCP:   JORDAN   This is a new problem. The current episode started in the past 7 days. The problem has been waxing and waning. There has been no fever. Associated symptoms include congestion, coughing, rhinorrhea and sinus pain. Pertinent negatives include no nausea or vomiting. He has tried NSAIDs for the symptoms. The treatment provided mild relief.   Fatigue  This is a new problem. The current episode started in the past 7 days. The problem occurs intermittently. The problem has been waxing and waning. Associated symptoms include congestion, coughing and fatigue. Pertinent negatives include no fever, nausea or vomiting. He has tried NSAIDs for the symptoms. The treatment provided mild relief.       Objective   Vital Signs:  /80   Pulse 96   Ht 175.3 cm (69\")   Wt 61.8 kg (136 lb 3.2 oz)   SpO2 97%   BMI 20.11 kg/m²   Estimated body mass index is 20.11 kg/m² as calculated from the following:    Height as of this encounter: 175.3 cm (69\").    Weight as of this encounter: 61.8 kg (136 lb 3.2 oz).    BMI is within normal parameters. No other follow-up for BMI required.      Physical Exam  Vitals and nursing note reviewed.   Constitutional:       Appearance: Normal appearance.   HENT:      Head: Normocephalic.      Right Ear: Tympanic membrane normal.      Left Ear: Tympanic membrane normal.      Nose: Nose normal.      Mouth/Throat:      Mouth: Mucous membranes are moist.   Eyes:      Extraocular Movements: Extraocular movements intact.      Pupils: Pupils are equal, round, and reactive to light.   Cardiovascular:      Rate and Rhythm: Normal rate and regular rhythm.      Pulses: Normal pulses.      Heart sounds: Normal heart sounds.   Pulmonary:      Effort: Pulmonary effort is " normal.      Breath sounds: Normal breath sounds.   Abdominal:      General: Abdomen is flat. Bowel sounds are normal.      Palpations: Abdomen is soft.   Musculoskeletal:         General: Normal range of motion.      Cervical back: Normal range of motion and neck supple.   Skin:     General: Skin is warm.      Capillary Refill: Capillary refill takes less than 2 seconds.   Neurological:      General: No focal deficit present.      Mental Status: He is alert and oriented to person, place, and time.   Psychiatric:         Mood and Affect: Mood normal.        Result Review :  The following data was reviewed by: Vasiliy Santos MD on 03/19/2024:  labs covid/flu negative             Assessment and Plan   Diagnoses and all orders for this visit:    1. Chest congestion (Primary)  -     POCT SARS-CoV-2 Antigen JOSE + Flu  -     dexAMETHasone (DECADRON) injection 8 mg    2. Nasal congestion  -     POCT SARS-CoV-2 Antigen JOSE + Flu  -     dexAMETHasone (DECADRON) injection 8 mg    Other orders  -     azithromycin (Zithromax) 250 MG tablet; Take 2 tablets the first day, then 1 tablet daily for 4 days.  Dispense: 6 tablet; Refill: 1  -     brompheniramine-pseudoephedrine-DM 30-2-10 MG/5ML syrup; Take 5 mL by mouth 3 (Three) Times a Day As Needed for Allergies for up to 10 days.  Dispense: 150 mL; Refill: 0           I spent 30 minutes caring for Tobias on this date of service. This time includes time spent by me in the following activities:reviewing tests, obtaining and/or reviewing a separately obtained history, performing a medically appropriate examination and/or evaluation , counseling and educating the patient/family/caregiver, ordering medications, tests, or procedures, and documenting information in the medical record       Follow Up   Return in about 1 month (around 4/19/2024) for Annual physical, RTC FASTING LABS.  Patient was given instructions and counseling regarding his condition or for health maintenance  advice. Please see specific information pulled into the AVS if appropriate.           This document has been electronically signed by Vasiliy Santos MD  March 19, 2024 15:52 EDT

## 2024-03-19 NOTE — LETTER
March 19, 2024     Patient: Tobias Mariscal   YOB: 1980   Date of Visit: 3/19/2024       To Whom It May Concern:    It is my medical opinion that Tobias Mariscal may return to work ON 3/20/24         Sincerely,          This document has been electronically signed by Vasiliy Santos MD  March 19, 2024 15:51 EDT

## 2024-04-19 ENCOUNTER — OFFICE VISIT (OUTPATIENT)
Dept: FAMILY MEDICINE CLINIC | Facility: CLINIC | Age: 44
End: 2024-04-19
Payer: COMMERCIAL

## 2024-04-19 VITALS
SYSTOLIC BLOOD PRESSURE: 142 MMHG | WEIGHT: 134 LBS | BODY MASS INDEX: 19.85 KG/M2 | OXYGEN SATURATION: 92 % | HEIGHT: 69 IN | RESPIRATION RATE: 18 BRPM | TEMPERATURE: 97.5 F | DIASTOLIC BLOOD PRESSURE: 78 MMHG | HEART RATE: 75 BPM

## 2024-04-19 DIAGNOSIS — Z00.00 ENCOUNTER FOR ANNUAL PHYSICAL EXAM: Primary | ICD-10-CM

## 2024-04-19 DIAGNOSIS — F41.1 GENERALIZED ANXIETY DISORDER: ICD-10-CM

## 2024-04-19 DIAGNOSIS — I27.21 PULMONARY ARTERIAL HYPERTENSION: ICD-10-CM

## 2024-04-19 LAB
ALBUMIN SERPL-MCNC: 4 G/DL (ref 3.5–5.2)
ALBUMIN/GLOB SERPL: 1.9 G/DL
ALP SERPL-CCNC: 126 U/L (ref 39–117)
ALT SERPL W P-5'-P-CCNC: 29 U/L (ref 1–41)
ANION GAP SERPL CALCULATED.3IONS-SCNC: 9.4 MMOL/L (ref 5–15)
AST SERPL-CCNC: 22 U/L (ref 1–40)
BASOPHILS # BLD AUTO: 0.11 10*3/MM3 (ref 0–0.2)
BASOPHILS NFR BLD AUTO: 1 % (ref 0–1.5)
BILIRUB SERPL-MCNC: 0.4 MG/DL (ref 0–1.2)
BUN SERPL-MCNC: 14 MG/DL (ref 6–20)
BUN/CREAT SERPL: 21.9 (ref 7–25)
CALCIUM SPEC-SCNC: 8.8 MG/DL (ref 8.6–10.5)
CHLORIDE SERPL-SCNC: 104 MMOL/L (ref 98–107)
CHOLEST SERPL-MCNC: 142 MG/DL (ref 0–200)
CO2 SERPL-SCNC: 26.6 MMOL/L (ref 22–29)
CREAT SERPL-MCNC: 0.64 MG/DL (ref 0.76–1.27)
DEPRECATED RDW RBC AUTO: 46.1 FL (ref 37–54)
EGFRCR SERPLBLD CKD-EPI 2021: 121.2 ML/MIN/1.73
EOSINOPHIL # BLD AUTO: 0.41 10*3/MM3 (ref 0–0.4)
EOSINOPHIL NFR BLD AUTO: 3.8 % (ref 0.3–6.2)
ERYTHROCYTE [DISTWIDTH] IN BLOOD BY AUTOMATED COUNT: 12.4 % (ref 12.3–15.4)
GLOBULIN UR ELPH-MCNC: 2.1 GM/DL
GLUCOSE SERPL-MCNC: 81 MG/DL (ref 65–99)
HBA1C MFR BLD: 5.5 % (ref 4.8–5.6)
HCT VFR BLD AUTO: 43.4 % (ref 37.5–51)
HDLC SERPL-MCNC: 38 MG/DL (ref 40–60)
HGB BLD-MCNC: 14.1 G/DL (ref 13–17.7)
IMM GRANULOCYTES # BLD AUTO: 0.03 10*3/MM3 (ref 0–0.05)
IMM GRANULOCYTES NFR BLD AUTO: 0.3 % (ref 0–0.5)
LDLC SERPL CALC-MCNC: 89 MG/DL (ref 0–100)
LDLC/HDLC SERPL: 2.34 {RATIO}
LYMPHOCYTES # BLD AUTO: 3 10*3/MM3 (ref 0.7–3.1)
LYMPHOCYTES NFR BLD AUTO: 28 % (ref 19.6–45.3)
MCH RBC QN AUTO: 32.4 PG (ref 26.6–33)
MCHC RBC AUTO-ENTMCNC: 32.5 G/DL (ref 31.5–35.7)
MCV RBC AUTO: 99.8 FL (ref 79–97)
MONOCYTES # BLD AUTO: 1.01 10*3/MM3 (ref 0.1–0.9)
MONOCYTES NFR BLD AUTO: 9.4 % (ref 5–12)
NEUTROPHILS NFR BLD AUTO: 57.5 % (ref 42.7–76)
NEUTROPHILS NFR BLD AUTO: 6.14 10*3/MM3 (ref 1.7–7)
NRBC BLD AUTO-RTO: 0 /100 WBC (ref 0–0.2)
PLATELET # BLD AUTO: 381 10*3/MM3 (ref 140–450)
PMV BLD AUTO: 10.6 FL (ref 6–12)
POTASSIUM SERPL-SCNC: 4 MMOL/L (ref 3.5–5.2)
PROT SERPL-MCNC: 6.1 G/DL (ref 6–8.5)
RBC # BLD AUTO: 4.35 10*6/MM3 (ref 4.14–5.8)
SODIUM SERPL-SCNC: 140 MMOL/L (ref 136–145)
TRIGL SERPL-MCNC: 75 MG/DL (ref 0–150)
TSH SERPL DL<=0.05 MIU/L-ACNC: 3.59 UIU/ML (ref 0.27–4.2)
VLDLC SERPL-MCNC: 15 MG/DL (ref 5–40)
WBC NRBC COR # BLD AUTO: 10.7 10*3/MM3 (ref 3.4–10.8)

## 2024-04-19 PROCEDURE — 80061 LIPID PANEL: CPT | Performed by: PSYCHOLOGIST

## 2024-04-19 PROCEDURE — 80050 GENERAL HEALTH PANEL: CPT | Performed by: PSYCHOLOGIST

## 2024-04-19 PROCEDURE — 83036 HEMOGLOBIN GLYCOSYLATED A1C: CPT | Performed by: PSYCHOLOGIST

## 2024-04-19 PROCEDURE — 82306 VITAMIN D 25 HYDROXY: CPT | Performed by: PSYCHOLOGIST

## 2024-04-19 PROCEDURE — 82607 VITAMIN B-12: CPT | Performed by: PSYCHOLOGIST

## 2024-04-19 RX ORDER — CHLORTHALIDONE 25 MG/1
TABLET ORAL
Qty: 45 TABLET | OUTPATIENT
Start: 2024-04-19

## 2024-04-19 RX ORDER — LORATADINE 10 MG/1
1 TABLET ORAL DAILY
COMMUNITY
Start: 2024-04-13

## 2024-04-19 RX ORDER — CHLORTHALIDONE 25 MG/1
TABLET ORAL
Qty: 15 TABLET | Refills: 0 | Status: SHIPPED | OUTPATIENT
Start: 2024-04-19

## 2024-04-19 NOTE — ASSESSMENT & PLAN NOTE
He was unable to tolerate previous BP med and his BP was elevated today. Will start with a low dose BP med

## 2024-04-19 NOTE — PROGRESS NOTES
"Chief Complaint  Annual Exam    Subjective        Tobias Mariscal presents to [facility]  C/o 1. Annual physical 2. Fasting labs 3. Chronic illness   Anxiety  Presents for follow-up visit.  Patient reports no chest pain, decreased concentration, depressed mood, nervous/anxious behavior, palpitations, shortness of breath or suicidal ideas. Symptoms occur occasionally. The severity of symptoms is mild. The patient sleeps 6 hours per night. The quality of sleep is good. Awakens seldom during the night. Past treatments include lifestyle changes. The treatment provided moderate relief. Compliance with medications is %.   Hypertension  The current episode started more than 1 year ago. The problem has been worse since onset. The problem is uncontrolled. Associated symptoms include anxiety. Pertinent negatives include no chest pain, headaches, palpitations or shortness of breath. Risk factors for coronary artery disease include smoking/tobacco exposure and male gender. Past treatments include lifestyle changes and ACE inhibitors. Current antihypertension treatment includes lifestyle changes and diuretics. The current treatment provides no improvement. Compliance problems include medication side effects.  There is no history of angina, kidney disease or CAD/MI. There is no history of chronic renal disease or a thyroid problem.       Objective   Vital Signs:  /78 (BP Location: Left arm, Patient Position: Sitting, Cuff Size: Adult)   Pulse 75   Temp 97.5 °F (36.4 °C) (Temporal)   Resp 18   Ht 175.3 cm (69\")   Wt 60.8 kg (134 lb)   SpO2 92%   BMI 19.79 kg/m²   Estimated body mass index is 19.79 kg/m² as calculated from the following:    Height as of this encounter: 175.3 cm (69\").    Weight as of this encounter: 60.8 kg (134 lb).    BMI is within normal parameters. No other follow-up for BMI required.      Physical Exam  Vitals and nursing note reviewed. Exam conducted with a " chaperone present.   Constitutional:       General: He is awake.      Appearance: Normal appearance. He is well-developed, well-groomed and normal weight.   HENT:      Head: Normocephalic and atraumatic.      Jaw: There is normal jaw occlusion.      Right Ear: Hearing, tympanic membrane, ear canal and external ear normal.      Left Ear: Hearing, tympanic membrane, ear canal and external ear normal.      Nose: Nose normal.      Mouth/Throat:      Lips: Pink.      Mouth: Mucous membranes are moist.      Pharynx: Oropharynx is clear.   Eyes:      General: Lids are normal. Vision grossly intact. Gaze aligned appropriately.      Extraocular Movements: Extraocular movements intact.      Conjunctiva/sclera: Conjunctivae normal.      Pupils: Pupils are equal, round, and reactive to light.   Neck:      Trachea: Trachea and phonation normal.   Cardiovascular:      Rate and Rhythm: Normal rate and regular rhythm.      Pulses: Normal pulses.      Heart sounds: Normal heart sounds.   Pulmonary:      Effort: Pulmonary effort is normal.      Breath sounds: Normal breath sounds and air entry.   Abdominal:      General: Abdomen is flat. Bowel sounds are normal.      Palpations: Abdomen is soft.   Genitourinary:     Rectum: Normal.   Musculoskeletal:         General: Normal range of motion.      Right shoulder: Normal.      Left shoulder: Normal.      Right upper arm: Normal.      Left upper arm: Normal.      Right elbow: Normal.      Left elbow: Normal.      Right forearm: Normal.      Left forearm: Normal.      Right wrist: Normal.      Left wrist: Normal.      Right hand: Normal.      Left hand: Normal.      Cervical back: Normal, full passive range of motion without pain, normal range of motion and neck supple.      Thoracic back: Normal.      Lumbar back: Normal.      Right hip: Normal.      Left hip: Normal.      Right upper leg: Normal.      Left upper leg: Normal.      Right knee: Normal.      Left knee: Normal.      Right  lower leg: Normal. No edema.      Left lower leg: Normal. No edema.      Right ankle: Normal.      Right Achilles Tendon: Normal.      Left ankle: Normal.      Left Achilles Tendon: Normal.      Right foot: Normal.      Left foot: Normal.   Lymphadenopathy:      Cervical: No cervical adenopathy.   Skin:     General: Skin is warm.      Capillary Refill: Capillary refill takes less than 2 seconds.   Neurological:      General: No focal deficit present.      Mental Status: He is alert and oriented to person, place, and time.      Cranial Nerves: Cranial nerves 2-12 are intact.      Sensory: Sensation is intact.      Motor: Motor function is intact.      Coordination: Coordination is intact.      Gait: Gait is intact.      Deep Tendon Reflexes: Reflexes are normal and symmetric.   Psychiatric:         Attention and Perception: Attention and perception normal.         Mood and Affect: Mood and affect normal.         Speech: Speech normal.         Behavior: Behavior normal. Behavior is cooperative.         Thought Content: Thought content normal.         Cognition and Memory: Cognition and memory normal.         Judgment: Judgment normal.        Result Review :  The following data was reviewed by: Vasiliy Santos MD on 04/19/2024:  Common labs          5/4/2023    15:42   Common Labs   Glucose 80    BUN 12    Creatinine 0.68    Sodium 137    Potassium 4.7    Chloride 101    Calcium 8.4    Albumin 3.7    Total Bilirubin 0.4    Alkaline Phosphatase 124    AST (SGOT) 19    ALT (SGPT) 22    WBC 22.34    Hemoglobin 14.2    Hematocrit 43.9    Platelets 456    Total Cholesterol 134    Triglycerides 86    HDL Cholesterol 56    LDL Cholesterol  62      Data reviewed : Radiologic studies 5/31/23 CT Head           Assessment and Plan   Diagnoses and all orders for this visit:    1. Encounter for annual physical exam (Primary)  -     CBC & Differential  -     Comprehensive Metabolic Panel  -     Hemoglobin A1c  -     Lipid  Panel  -     TSH  -     Vitamin D,25-Hydroxy  -     Vitamin B12  -     POC Urinalysis Dipstick    2. Generalized anxiety disorder  Assessment & Plan:  Psychological condition is  lifestytle modification  .  Continue current treatment regimen.  Regular aerobic exercise.  Psychological condition  will be reassessed at the next regular appointment.    Orders:  -     CBC & Differential  -     Comprehensive Metabolic Panel  -     Hemoglobin A1c  -     Lipid Panel  -     TSH  -     Vitamin D,25-Hydroxy  -     Vitamin B12  -     POC Urinalysis Dipstick    3. Pulmonary arterial hypertension  Comments:  BP monitoring sheet given to him  Assessment & Plan:  He was unable to tolerate previous BP med and his BP was elevated today. Will start with a low dose BP med    Orders:  -     CBC & Differential  -     Comprehensive Metabolic Panel  -     Hemoglobin A1c  -     Lipid Panel  -     TSH  -     Vitamin D,25-Hydroxy  -     Vitamin B12  -     POC Urinalysis Dipstick  -     Cuffed BP Gauge ANEROID With Gauge    Other orders  -     chlorthalidone (HYGROTON) 25 MG tablet; Take 1/2 tab daily in the morning  Dispense: 15 tablet; Refill: 0           I spent 30 minutes caring for Tobias on this date of service. This time includes time spent by me in the following activities:reviewing tests, obtaining and/or reviewing a separately obtained history, performing a medically appropriate examination and/or evaluation , counseling and educating the patient/family/caregiver, ordering medications, tests, or procedures, and documenting information in the medical record     The preventive exam has been reviewed in detail.  The patient has been fully counseled on preventative guidelines for vaccines, cancer screenings, and other health maintenance needs.   The patient has been counseled on guidelines for maintaining a lifestyle to promote good health and to minimize chronic diseases.  The patient has been assisted with scheduling these healthcare  procedures for the coming year and given a written document of health maintenance and anticipatory guidance for age with the AVS.       Follow Up   Return in about 2 weeks (around 5/3/2024) for Recheck- new BP med started .  Patient was given instructions and counseling regarding his condition or for health maintenance advice. Please see specific information pulled into the AVS if appropriate.           This document has been electronically signed by Vasiliy Santos MD  April 19, 2024 11:16 EDT

## 2024-04-19 NOTE — ASSESSMENT & PLAN NOTE
Psychological condition is  lifestytle modification  .  Continue current treatment regimen.  Regular aerobic exercise.  Psychological condition  will be reassessed at the next regular appointment.

## 2024-04-20 LAB
25(OH)D3 SERPL-MCNC: 7.5 NG/ML (ref 30–100)
VIT B12 BLD-MCNC: >2000 PG/ML (ref 211–946)

## 2024-05-02 RX ORDER — ERGOCALCIFEROL 1.25 MG/1
50000 CAPSULE ORAL WEEKLY
Qty: 12 CAPSULE | Refills: 0 | Status: SHIPPED | OUTPATIENT
Start: 2024-05-02 | End: 2024-07-25

## 2024-05-03 ENCOUNTER — OFFICE VISIT (OUTPATIENT)
Dept: FAMILY MEDICINE CLINIC | Facility: CLINIC | Age: 44
End: 2024-05-03
Payer: COMMERCIAL

## 2024-05-03 VITALS
SYSTOLIC BLOOD PRESSURE: 152 MMHG | OXYGEN SATURATION: 94 % | HEIGHT: 69 IN | RESPIRATION RATE: 20 BRPM | HEART RATE: 99 BPM | DIASTOLIC BLOOD PRESSURE: 78 MMHG | BODY MASS INDEX: 20.14 KG/M2 | TEMPERATURE: 98.2 F | WEIGHT: 136 LBS

## 2024-05-03 DIAGNOSIS — I10 HYPERTENSION, UNSPECIFIED TYPE: Primary | ICD-10-CM

## 2024-05-03 DIAGNOSIS — E55.9 VITAMIN D DEFICIENCY: ICD-10-CM

## 2024-05-03 PROCEDURE — 1159F MED LIST DOCD IN RCRD: CPT | Performed by: PSYCHOLOGIST

## 2024-05-03 PROCEDURE — 3077F SYST BP >= 140 MM HG: CPT | Performed by: PSYCHOLOGIST

## 2024-05-03 PROCEDURE — 99213 OFFICE O/P EST LOW 20 MIN: CPT | Performed by: PSYCHOLOGIST

## 2024-05-03 PROCEDURE — 3078F DIAST BP <80 MM HG: CPT | Performed by: PSYCHOLOGIST

## 2024-05-03 PROCEDURE — 1160F RVW MEDS BY RX/DR IN RCRD: CPT | Performed by: PSYCHOLOGIST

## 2024-05-03 RX ORDER — BUPRENORPHINE HYDROCHLORIDE AND NALOXONE HYDROCHLORIDE DIHYDRATE 8; 2 MG/1; MG/1
1 TABLET SUBLINGUAL DAILY
COMMUNITY
Start: 2024-04-26

## 2024-05-03 RX ORDER — AMLODIPINE BESYLATE 2.5 MG/1
2.5 TABLET ORAL
Qty: 15 TABLET | Refills: 0 | Status: SHIPPED | OUTPATIENT
Start: 2024-05-03 | End: 2024-05-18

## 2024-05-03 RX ORDER — IBUPROFEN 800 MG/1
1 TABLET ORAL 3 TIMES DAILY
COMMUNITY
Start: 2024-04-26

## 2024-05-03 NOTE — PROGRESS NOTES
"Chief Complaint  Follow-up (Re-check B/P - efficacy of new med/States took 2 days and couldn't take anymore, no energy to even get out of bed)    Subjective        Tobias Mariscal presents to [facility]  C/o follow up BP -- ck on new med taken:   Hypertension  This is a new problem. The current episode started 1 to 4 weeks ago. The problem is uncontrolled. Associated symptoms include anxiety. Pertinent negatives include no chest pain, headaches, palpitations or shortness of breath. Risk factors for coronary artery disease include smoking/tobacco exposure. Past treatments include diuretics, ACE inhibitors and lifestyle changes. Current antihypertension treatment includes lifestyle changes and diuretics. The current treatment provides mild improvement. Compliance problems include medication side effects.  There is no history of angina, kidney disease or CAD/MI. There is no history of chronic renal disease or a thyroid problem.       Objective   Vital Signs:  /78 (BP Location: Right arm, Patient Position: Sitting, Cuff Size: Adult)   Pulse 99   Temp 98.2 °F (36.8 °C) (Temporal)   Resp 20   Ht 175.3 cm (69\")   Wt 61.7 kg (136 lb)   SpO2 94%   BMI 20.08 kg/m²   Estimated body mass index is 20.08 kg/m² as calculated from the following:    Height as of this encounter: 175.3 cm (69\").    Weight as of this encounter: 61.7 kg (136 lb).    BMI is within normal parameters. No other follow-up for BMI required.      Physical Exam  Vitals and nursing note reviewed.   Constitutional:       Appearance: Normal appearance.   HENT:      Head: Normocephalic.      Right Ear: Tympanic membrane normal.      Left Ear: Tympanic membrane normal.      Nose: Nose normal.      Mouth/Throat:      Mouth: Mucous membranes are moist.   Eyes:      Extraocular Movements: Extraocular movements intact.      Pupils: Pupils are equal, round, and reactive to light.   Cardiovascular:      Rate and Rhythm: Normal rate " and regular rhythm.      Pulses: Normal pulses.      Heart sounds: Normal heart sounds.   Pulmonary:      Effort: Pulmonary effort is normal.      Breath sounds: Normal breath sounds.   Abdominal:      General: Abdomen is flat. Bowel sounds are normal.      Palpations: Abdomen is soft.   Musculoskeletal:         General: Normal range of motion.      Cervical back: Normal range of motion and neck supple.   Skin:     General: Skin is warm.      Capillary Refill: Capillary refill takes less than 2 seconds.   Neurological:      General: No focal deficit present.      Mental Status: He is alert and oriented to person, place, and time.   Psychiatric:         Mood and Affect: Mood normal.        Result Review :  The following data was reviewed by: Vasiliy Santos MD on 05/03/2024:  Common labs          4/19/2024    09:49   Common Labs   Glucose 81    BUN 14    Creatinine 0.64    Sodium 140    Potassium 4.0    Chloride 104    Calcium 8.8    Albumin 4.0    Total Bilirubin 0.4    Alkaline Phosphatase 126    AST (SGOT) 22    ALT (SGPT) 29    WBC 10.70    Hemoglobin 14.1    Hematocrit 43.4    Platelets 381    Total Cholesterol 142    Triglycerides 75    HDL Cholesterol 38    LDL Cholesterol  89    Hemoglobin A1C 5.50               Assessment and Plan   Diagnoses and all orders for this visit:    1. Hypertension, unspecified type (Primary)  Assessment & Plan:  Hypertension is uncontrolled  Medication changes per orders.  Dietary sodium restriction.  Regular aerobic exercise.  Stop smoking.  Blood pressure will be reassessedat the next regular appointment.    Have has SE with lisinopril/ hctz/chlorthalidone   Will d/c today and trial norvasc 2.5 mg qd   Will continue to work with patient on finding BP med  That he can tolerate to help control bp             2. Vitamin D deficiency  Assessment & Plan:  Will need to prescribe meds and ghada levels in 3 mos      Other orders  -     amLODIPine (NORVASC) 2.5 MG tablet; Take 1  tablet by mouth Daily With Breakfast for 15 days.  Dispense: 15 tablet; Refill: 0           I spent 20 minutes caring for Tobias on this date of service. This time includes time spent by me in the following activities:reviewing tests, obtaining and/or reviewing a separately obtained history, performing a medically appropriate examination and/or evaluation , counseling and educating the patient/family/caregiver, ordering medications, tests, or procedures, and documenting information in the medical record     Follow Up   No follow-ups on file.  Patient was given instructions and counseling regarding his condition or for health maintenance advice. Please see specific information pulled into the AVS if appropriate.           This document has been electronically signed by Vasiliy Santos MD  May 3, 2024 14:05 EDT

## 2024-05-03 NOTE — ASSESSMENT & PLAN NOTE
Hypertension is uncontrolled  Medication changes per orders.  Dietary sodium restriction.  Regular aerobic exercise.  Stop smoking.  Blood pressure will be reassessedat the next regular appointment.    Have has SE with lisinopril/ hctz/chlorthalidone   Will d/c today and trial norvasc 2.5 mg qd   Will continue to work with patient on finding BP med  That he can tolerate to help control bp

## 2024-05-20 ENCOUNTER — TELEPHONE (OUTPATIENT)
Dept: FAMILY MEDICINE CLINIC | Facility: CLINIC | Age: 44
End: 2024-05-20
Payer: COMMERCIAL

## 2024-05-20 NOTE — TELEPHONE ENCOUNTER
Caller: EUNICE ROBERSON    Relationship: Emergency Contact    Best call back number: [phone]    Requested Prescriptions:   Requested Prescriptions      No prescriptions requested or ordered in this encounter        Pharmacy where request should be sent: [id number] - [facility] [address] AT SEC OF [facility] [address] & [facility] [phone] [facility] [phone]      Last office visit with prescribing clinician: 5/3/2024   Last telemedicine visit with prescribing clinician: Visit date not found   Next office visit with prescribing clinician: 5/24/2024     Additional details provided by patient: AMLODIPINE 2.5MG TABLETS    Does the patient have less than a 3 day supply:  [x] Yes  [] No      Jacek Campbell   05/20/24 14:21 EDT

## 2024-05-22 NOTE — TELEPHONE ENCOUNTER
Rx Refill Note  Requested Prescriptions      No prescriptions requested or ordered in this encounter      Last office visit with prescribing clinician: 5/3/2024   Last telemedicine visit with prescribing clinician: Visit date not found   Next office visit with prescribing clinician: 5/24/2024                         Would you like a call back once the refill request has been completed: [] Yes [] No    If the office needs to give you a call back, can they leave a voicemail: [] Yes [] No    Trina Brown CMA  05/22/24, 10:20 EDT

## 2024-05-27 ENCOUNTER — DOCUMENTATION (OUTPATIENT)
Dept: FAMILY MEDICINE CLINIC | Facility: CLINIC | Age: 44
End: 2024-05-27
Payer: COMMERCIAL

## 2024-05-27 RX ORDER — AMLODIPINE BESYLATE 5 MG/1
5 TABLET ORAL DAILY
Qty: 30 TABLET | Refills: 0 | Status: SHIPPED | OUTPATIENT
Start: 2024-05-27 | End: 2024-05-28

## 2024-05-28 RX ORDER — AMLODIPINE BESYLATE 5 MG/1
5 TABLET ORAL DAILY
Qty: 90 TABLET | Refills: 1 | Status: SHIPPED | OUTPATIENT
Start: 2024-05-28

## 2024-05-28 NOTE — TELEPHONE ENCOUNTER
Rx Refill Note  Requested Prescriptions     Pending Prescriptions Disp Refills    amLODIPine (NORVASC) 5 MG tablet [Pharmacy Med Name: AMLODIPINE BESYLATE 5MG TABLETS] 90 tablet      Sig: TAKE 1 TABLET BY MOUTH DAILY      Last office visit with prescribing clinician: 5/3/2024   Last telemedicine visit with prescribing clinician: Visit date not found   Next office visit with prescribing clinician: 5/31/2024                         Would you like a call back once the refill request has been completed: [] Yes [] No    If the office needs to give you a call back, can they leave a voicemail: [] Yes [] No    Trina Brown CMA  05/28/24, 13:00 EDT

## 2024-05-31 ENCOUNTER — OFFICE VISIT (OUTPATIENT)
Dept: FAMILY MEDICINE CLINIC | Facility: CLINIC | Age: 44
End: 2024-05-31
Payer: COMMERCIAL

## 2024-05-31 VITALS
WEIGHT: 146.6 LBS | HEIGHT: 69 IN | SYSTOLIC BLOOD PRESSURE: 165 MMHG | DIASTOLIC BLOOD PRESSURE: 83 MMHG | BODY MASS INDEX: 21.71 KG/M2 | TEMPERATURE: 97.8 F | HEART RATE: 85 BPM | OXYGEN SATURATION: 97 % | RESPIRATION RATE: 20 BRPM

## 2024-05-31 DIAGNOSIS — I10 HYPERTENSION, UNSPECIFIED TYPE: Primary | ICD-10-CM

## 2024-05-31 PROCEDURE — 1126F AMNT PAIN NOTED NONE PRSNT: CPT | Performed by: PSYCHOLOGIST

## 2024-05-31 PROCEDURE — 1159F MED LIST DOCD IN RCRD: CPT | Performed by: PSYCHOLOGIST

## 2024-05-31 PROCEDURE — 99213 OFFICE O/P EST LOW 20 MIN: CPT | Performed by: PSYCHOLOGIST

## 2024-05-31 PROCEDURE — 3079F DIAST BP 80-89 MM HG: CPT | Performed by: PSYCHOLOGIST

## 2024-05-31 PROCEDURE — 1160F RVW MEDS BY RX/DR IN RCRD: CPT | Performed by: PSYCHOLOGIST

## 2024-05-31 PROCEDURE — 3077F SYST BP >= 140 MM HG: CPT | Performed by: PSYCHOLOGIST

## 2024-05-31 NOTE — PROGRESS NOTES
"Chief Complaint  Hypertension (FOLLOW-UP ON MEDICATION. )    Subjective        Tobias Mariscal presents to [facility]  C/O FOLLOW UP HYPERTENSION  Hypertension  This is a chronic problem. The current episode started more than 1 year ago. The problem has been improved since onset. Pertinent negatives include no chest pain, headaches, palpitations or shortness of breath. Past treatments include lifestyle changes and calcium channel blockers. Current antihypertension treatment includes lifestyle changes and calcium channel blockers. The current treatment provides moderate improvement. There are no compliance problems.  There is no history of angina, kidney disease or CAD/MI. There is no history of chronic renal disease or a thyroid problem.       Objective   Vital Signs:  /83 (BP Location: Left arm, Patient Position: Sitting, Cuff Size: Adult)   Pulse 85   Temp 97.8 °F (36.6 °C) (Temporal)   Resp 20   Ht 175.3 cm (69\")   Wt 66.5 kg (146 lb 9.6 oz)   SpO2 97%   BMI 21.65 kg/m²   Estimated body mass index is 21.65 kg/m² as calculated from the following:    Height as of this encounter: 175.3 cm (69\").    Weight as of this encounter: 66.5 kg (146 lb 9.6 oz).    BMI is within normal parameters. No other follow-up for BMI required.      Physical Exam  Vitals and nursing note reviewed.   Constitutional:       Appearance: Normal appearance.   HENT:      Head: Normocephalic.      Right Ear: Tympanic membrane normal.      Left Ear: Tympanic membrane normal.      Nose: Nose normal.      Mouth/Throat:      Mouth: Mucous membranes are moist.   Eyes:      Extraocular Movements: Extraocular movements intact.      Pupils: Pupils are equal, round, and reactive to light.   Cardiovascular:      Rate and Rhythm: Normal rate and regular rhythm.      Pulses: Normal pulses.      Heart sounds: Normal heart sounds.   Pulmonary:      Effort: Pulmonary effort is normal.      Breath sounds: Normal breath " sounds.   Abdominal:      General: Abdomen is flat. Bowel sounds are normal.      Palpations: Abdomen is soft.   Musculoskeletal:         General: Normal range of motion.      Cervical back: Normal range of motion and neck supple.   Skin:     General: Skin is warm.      Capillary Refill: Capillary refill takes less than 2 seconds.   Neurological:      General: No focal deficit present.      Mental Status: He is alert and oriented to person, place, and time.   Psychiatric:         Mood and Affect: Mood normal.        Result Review :  The following data was reviewed by: Vasiliy Santos MD on 05/31/2024:  Common labs          4/19/2024    09:49   Common Labs   Glucose 81    BUN 14    Creatinine 0.64    Sodium 140    Potassium 4.0    Chloride 104    Calcium 8.8    Albumin 4.0    Total Bilirubin 0.4    Alkaline Phosphatase 126    AST (SGOT) 22    ALT (SGPT) 29    WBC 10.70    Hemoglobin 14.1    Hematocrit 43.4    Platelets 381    Total Cholesterol 142    Triglycerides 75    HDL Cholesterol 38    LDL Cholesterol  89    Hemoglobin A1C 5.50      Data reviewed : Radiologic studies 7/19/23 MRI BRAIN            Assessment and Plan   Diagnoses and all orders for this visit:    1. Hypertension, unspecified type (Primary)  Assessment & Plan:  Hypertension is stable and controlled  Continue current treatment regimen.  Dietary sodium restriction.  Weight loss.  Regular aerobic exercise.  Stop smoking.  Blood pressure will be reassessed in 1 month.             I spent 20 minutes caring for Tobias on this date of service. This time includes time spent by me in the following activities:reviewing tests, obtaining and/or reviewing a separately obtained history, performing a medically appropriate examination and/or evaluation , counseling and educating the patient/family/caregiver, ordering medications, tests, or procedures, referring and communicating with other health care professionals , and independently interpreting results  and communicating that information with the patient/family/caregiver  Follow Up   Return in about 6 weeks (around 7/9/2024) for Recheck-- HTN MEDS RESTARTED .  Patient was given instructions and counseling regarding his condition or for health maintenance advice. Please see specific information pulled into the AVS if appropriate.           This document has been electronically signed by Vasiliy Santos MD  May 31, 2024 14:23 EDT

## 2024-05-31 NOTE — ASSESSMENT & PLAN NOTE
Hypertension is stable and controlled  Continue current treatment regimen.  Dietary sodium restriction.  Weight loss.  Regular aerobic exercise.  Stop smoking.  Blood pressure will be reassessed in 1 month.

## 2024-06-24 RX ORDER — AMLODIPINE BESYLATE 5 MG/1
5 TABLET ORAL DAILY
Qty: 90 TABLET | Refills: 1 | Status: SHIPPED | OUTPATIENT
Start: 2024-06-24

## 2024-06-24 NOTE — TELEPHONE ENCOUNTER
Rx Refill Note  Requested Prescriptions     Pending Prescriptions Disp Refills    amLODIPine (NORVASC) 5 MG tablet [Pharmacy Med Name: AMLODIPINE BESYLATE 5MG TABLETS] 90 tablet 1     Sig: TAKE 1 TABLET BY MOUTH DAILY      Last office visit with prescribing clinician: 5/31/2024   Last telemedicine visit with prescribing clinician: Visit date not found   Next office visit with prescribing clinician: 7/12/2024                         Would you like a call back once the refill request has been completed: [] Yes [] No    If the office needs to give you a call back, can they leave a voicemail: [] Yes [] No    Trina Brown CMA  06/24/24, 12:50 EDT

## 2024-07-11 NOTE — ADDENDUM NOTE
Addended by: ESEQUIEL YA on: 11/8/2022 04:50 PM     Modules accepted: Level of Service     No Vaccines Administered.

## 2024-07-12 ENCOUNTER — OFFICE VISIT (OUTPATIENT)
Dept: FAMILY MEDICINE CLINIC | Facility: CLINIC | Age: 44
End: 2024-07-12
Payer: COMMERCIAL

## 2024-07-12 VITALS
SYSTOLIC BLOOD PRESSURE: 138 MMHG | RESPIRATION RATE: 20 BRPM | OXYGEN SATURATION: 97 % | DIASTOLIC BLOOD PRESSURE: 72 MMHG | BODY MASS INDEX: 22.45 KG/M2 | TEMPERATURE: 98.2 F | HEART RATE: 100 BPM | HEIGHT: 69 IN | WEIGHT: 151.6 LBS

## 2024-07-12 DIAGNOSIS — Z76.0 ENCOUNTER FOR MEDICATION REFILL: ICD-10-CM

## 2024-07-12 DIAGNOSIS — I10 HYPERTENSION, UNSPECIFIED TYPE: Primary | ICD-10-CM

## 2024-07-12 DIAGNOSIS — F19.20 ADDICTION TO DRUG: ICD-10-CM

## 2024-07-12 PROCEDURE — 1160F RVW MEDS BY RX/DR IN RCRD: CPT | Performed by: PSYCHOLOGIST

## 2024-07-12 PROCEDURE — 1159F MED LIST DOCD IN RCRD: CPT | Performed by: PSYCHOLOGIST

## 2024-07-12 PROCEDURE — 3078F DIAST BP <80 MM HG: CPT | Performed by: PSYCHOLOGIST

## 2024-07-12 PROCEDURE — 99213 OFFICE O/P EST LOW 20 MIN: CPT | Performed by: PSYCHOLOGIST

## 2024-07-12 PROCEDURE — 3075F SYST BP GE 130 - 139MM HG: CPT | Performed by: PSYCHOLOGIST

## 2024-07-12 PROCEDURE — 1126F AMNT PAIN NOTED NONE PRSNT: CPT | Performed by: PSYCHOLOGIST

## 2024-07-12 RX ORDER — BUPRENORPHINE HYDROCHLORIDE AND NALOXONE HYDROCHLORIDE DIHYDRATE 8; 2 MG/1; MG/1
1 TABLET SUBLINGUAL DAILY
Qty: 7 TABLET | Refills: 0 | Status: SHIPPED | OUTPATIENT
Start: 2024-07-12 | End: 2024-07-19

## 2024-07-12 NOTE — ASSESSMENT & PLAN NOTE
Hypertension is stable and controlled  Continue current treatment regimen.  Dietary sodium restriction.  Weight loss.  Regular aerobic exercise./STOP SMOKING  Blood pressure will be reassessed in 3 months.

## 2024-07-12 NOTE — ASSESSMENT & PLAN NOTE
RICHELLE -- HE WAS TAKING FOR PAIN RELIEF/ HERBAL WITH OPIOD LIKE EFFECT     HIS PROCVIDER WENT ON VACATION AND HE WILL RUN OUT PD MEDS RTC 7/18 HE HAS AN APPT -- WILL NEED 7 DAYS OF MEDS BEFORE HIS NEXT APPT --

## 2024-07-12 NOTE — LETTER
July 12, 2024     Patient: Tobias Mariscal   YOB: 1980   Date of Visit: 7/12/2024       To Whom It May Concern:    It is my medical opinion that Tobias Mariscal may return to work on 7/13/24 .             Sincerely,      This document has been electronically signed by Vasiliy Santos MD  July 12, 2024 14:22 EDT        Simponi Pregnancy And Lactation Text: The risk during pregnancy and breastfeeding is uncertain with this medication.

## 2024-07-12 NOTE — PROGRESS NOTES
"Chief Complaint  Follow-up (HTN - restarted Amlodipine, efficacy/States seems to be doing better)    Subjective        Tobias Mariscal presents to [facility]  C/O FOLLOW UP HTN -- MEDS STARTED:   Follow-up  Conditions present:  Other chronic condition  Current symptoms include no chest pain, no shortness of breath, no headaches and no peripheral edema. Medication compliance: good. Treatment compliance: all of the time. Exercises daily.   Hypertension: The problem is controlled. Current antihypertension treatment: calcium channel blockers. Past treatments: calcium channel blockers.   Additional hypertension comments: HE IS TOLERATING CURRENT DOSE OF MED --BP UNDER CONTROL    Other: Additional comments: ADDICTION TO DRUG: HE WILL NEED A REFILL UNTIL HE SEES HIS PROVIDER ON 7/18 24    SAMPLES OF BOOST/ENSURE GIVEN          Objective   Vital Signs:  /72 (BP Location: Left arm, Patient Position: Sitting, Cuff Size: Adult)   Pulse 100   Temp 98.2 °F (36.8 °C) (Temporal)   Resp 20   Ht 175.3 cm (69\")   Wt 68.8 kg (151 lb 9.6 oz)   SpO2 97%   BMI 22.39 kg/m²   Estimated body mass index is 22.39 kg/m² as calculated from the following:    Height as of this encounter: 175.3 cm (69\").    Weight as of this encounter: 68.8 kg (151 lb 9.6 oz).    BMI is within normal parameters. No other follow-up for BMI required.      Physical Exam  Vitals and nursing note reviewed.   Constitutional:       Appearance: Normal appearance.   HENT:      Head: Normocephalic.      Right Ear: Tympanic membrane normal.      Left Ear: Tympanic membrane normal.      Nose: Nose normal.      Mouth/Throat:      Mouth: Mucous membranes are moist.   Eyes:      Extraocular Movements: Extraocular movements intact.      Pupils: Pupils are equal, round, and reactive to light.   Cardiovascular:      Rate and Rhythm: Normal rate and regular rhythm.      Pulses: Normal pulses.      Heart sounds: Normal heart sounds. "   Pulmonary:      Effort: Pulmonary effort is normal.      Breath sounds: Normal breath sounds.   Abdominal:      General: Abdomen is flat. Bowel sounds are normal.      Palpations: Abdomen is soft.   Musculoskeletal:         General: Normal range of motion.      Cervical back: Normal range of motion and neck supple.   Skin:     General: Skin is warm.      Capillary Refill: Capillary refill takes less than 2 seconds.   Neurological:      General: No focal deficit present.      Mental Status: He is alert and oriented to person, place, and time.   Psychiatric:         Mood and Affect: Mood normal.        Result Review :  The following data was reviewed by: Vasiliy Santos MD on 07/12/2024:  Common labs          4/19/2024    09:49   Common Labs   Glucose 81    BUN 14    Creatinine 0.64    Sodium 140    Potassium 4.0    Chloride 104    Calcium 8.8    Albumin 4.0    Total Bilirubin 0.4    Alkaline Phosphatase 126    AST (SGOT) 22    ALT (SGPT) 29    WBC 10.70    Hemoglobin 14.1    Hematocrit 43.4    Platelets 381    Total Cholesterol 142    Triglycerides 75    HDL Cholesterol 38    LDL Cholesterol  89    Hemoglobin A1C 5.50      Data reviewed : Radiologic studies 7/19/23 MRI BRAIN            Assessment and Plan   Diagnoses and all orders for this visit:    1. Hypertension, unspecified type (Primary)  Comments:  CONTINUE CURRENT MED  Assessment & Plan:  Hypertension is stable and controlled  Continue current treatment regimen.  Dietary sodium restriction.  Weight loss.  Regular aerobic exercise./STOP SMOKING  Blood pressure will be reassessed in 3 months.      2. Addiction to drug  Assessment & Plan:  KRATOM -- HE WAS TAKING FOR PAIN RELIEF/ HERBAL WITH OPIOD LIKE EFFECT     HIS PROCVIDER WENT ON VACATION AND HE WILL RUN OUT PD MEDS RTC 7/18 HE HAS AN APPT -- WILL NEED 7 DAYS OF MEDS BEFORE HIS NEXT APPT --      3. Encounter for medication refill           I spent 20 minutes caring for Tobias on this date of  service. This time includes time spent by me in the following activities:reviewing tests, obtaining and/or reviewing a separately obtained history, performing a medically appropriate examination and/or evaluation , counseling and educating the patient/family/caregiver, ordering medications, tests, or procedures, and documenting information in the medical record       Follow Up   Return in about 9 months (around 4/22/2025) for Annual physical, RTC FASTING LABS & 3 MIS FOR LABS/ CHRONIC IL;LNESS .  Patient was given instructions and counseling regarding his condition or for health maintenance advice. Please see specific information pulled into the AVS if appropriate.           This document has been electronically signed by Vasiliy Santos MD  July 12, 2024 14:19 EDT

## 2024-09-23 RX ORDER — LORATADINE 10 MG/1
10 TABLET ORAL DAILY
Qty: 90 TABLET | Refills: 0 | Status: SHIPPED | OUTPATIENT
Start: 2024-09-23

## 2024-09-23 RX ORDER — AMLODIPINE BESYLATE 5 MG/1
5 TABLET ORAL DAILY
Qty: 90 TABLET | Refills: 1 | Status: SHIPPED | OUTPATIENT
Start: 2024-09-23

## 2024-10-09 ENCOUNTER — OFFICE VISIT (OUTPATIENT)
Dept: FAMILY MEDICINE CLINIC | Facility: CLINIC | Age: 44
End: 2024-10-09
Payer: COMMERCIAL

## 2024-10-09 VITALS
TEMPERATURE: 98.4 F | HEIGHT: 69 IN | OXYGEN SATURATION: 96 % | BODY MASS INDEX: 23.08 KG/M2 | DIASTOLIC BLOOD PRESSURE: 69 MMHG | SYSTOLIC BLOOD PRESSURE: 140 MMHG | WEIGHT: 155.8 LBS | HEART RATE: 93 BPM | RESPIRATION RATE: 20 BRPM

## 2024-10-09 DIAGNOSIS — I10 PRIMARY HYPERTENSION: Primary | ICD-10-CM

## 2024-10-09 DIAGNOSIS — J06.9 UPPER RESPIRATORY TRACT INFECTION, UNSPECIFIED TYPE: ICD-10-CM

## 2024-10-09 RX ORDER — BUPRENORPHINE HYDROCHLORIDE AND NALOXONE HYDROCHLORIDE DIHYDRATE 8; 2 MG/1; MG/1
TABLET SUBLINGUAL
COMMUNITY
Start: 2024-09-14 | End: 2024-10-09

## 2024-10-09 RX ORDER — BUPRENORPHINE HYDROCHLORIDE AND NALOXONE HYDROCHLORIDE DIHYDRATE 2; .5 MG/1; MG/1
TABLET SUBLINGUAL
COMMUNITY
Start: 2024-10-02 | End: 2024-10-09

## 2024-10-09 RX ORDER — AMLODIPINE BESYLATE 5 MG/1
5 TABLET ORAL DAILY
Qty: 90 TABLET | Refills: 3 | Status: SHIPPED | OUTPATIENT
Start: 2024-10-09 | End: 2025-01-07

## 2024-10-09 RX ORDER — LORATADINE 10 MG/1
10 TABLET ORAL DAILY
Qty: 90 TABLET | Refills: 3 | Status: SHIPPED | OUTPATIENT
Start: 2024-10-09 | End: 2025-01-07

## 2024-10-09 RX ORDER — AZITHROMYCIN 250 MG/1
TABLET, FILM COATED ORAL
Qty: 6 TABLET | Refills: 0 | Status: SHIPPED | OUTPATIENT
Start: 2024-10-09

## 2024-10-09 RX ORDER — DEXAMETHASONE SODIUM PHOSPHATE 4 MG/ML
8 INJECTION, SOLUTION INTRA-ARTICULAR; INTRALESIONAL; INTRAMUSCULAR; INTRAVENOUS; SOFT TISSUE ONCE
Status: COMPLETED | OUTPATIENT
Start: 2024-10-09 | End: 2024-10-09

## 2024-10-09 RX ADMIN — DEXAMETHASONE SODIUM PHOSPHATE 8 MG: 4 INJECTION, SOLUTION INTRA-ARTICULAR; INTRALESIONAL; INTRAMUSCULAR; INTRAVENOUS; SOFT TISSUE at 15:53

## 2024-10-09 NOTE — LETTER
October 9, 2024     Patient: Tobias Mariscal   YOB: 1980   Date of Visit: 10/9/2024       To Whom It May Concern:    It is my medical opinion that Tobias Mariscal may return to work on 10/10/24 .           Sincerely,        Mio Joseph MD    CC: No Recipients

## 2024-10-09 NOTE — PROGRESS NOTES
"Chief Complaint  Hypertension (Medication refill ) and congestion  (Chest and sinus )    Subjective        Tobias Mariscal presents to [facility]  History of Present Illness  The patient is a 44 y.o. male who is here today because of congestion and post nasal drainage for 3 days. No fever, chills, body aches, shortness of breath, headaches or dizziness. The patient denies being exposed to covid, flu or strep. He is also here for follow up on his hypertension and refill on her medication. He denies any chest pain, shortness of breath, headaches or dizziness.      Objective   Vital Signs:  /69 (BP Location: Right arm, Patient Position: Sitting, Cuff Size: Adult)   Pulse 93   Temp 98.4 °F (36.9 °C) (Temporal)   Resp 20   Ht 175.3 cm (69\")   Wt 70.7 kg (155 lb 12.8 oz)   SpO2 96%   BMI 23.01 kg/m²   Estimated body mass index is 23.01 kg/m² as calculated from the following:    Height as of this encounter: 175.3 cm (69\").    Weight as of this encounter: 70.7 kg (155 lb 12.8 oz).    BMI is within normal parameters. No other follow-up for BMI required.      Physical Exam  Vitals and nursing note reviewed.   Constitutional:       General: He is not in acute distress.     Appearance: Normal appearance. He is well-developed and well-groomed.   HENT:      Head: Normocephalic and atraumatic.      Jaw: No tenderness or pain on movement.      Salivary Glands: Right salivary gland is not diffusely enlarged. Left salivary gland is not diffusely enlarged.      Right Ear: Tympanic membrane and ear canal normal.      Left Ear: Tympanic membrane and ear canal normal.      Nose: No congestion or rhinorrhea.      Mouth/Throat:      Mouth: Mucous membranes are moist.      Pharynx: No oropharyngeal exudate or posterior oropharyngeal erythema.   Eyes:      General: Lids are normal. No allergic shiner or scleral icterus.     Conjunctiva/sclera: Conjunctivae normal.      Pupils: Pupils are equal, round, " and reactive to light.   Neck:      Thyroid: No thyroid mass, thyromegaly or thyroid tenderness.      Trachea: Trachea normal.   Cardiovascular:      Rate and Rhythm: Normal rate and regular rhythm. No extrasystoles are present.     Pulses: Normal pulses.      Heart sounds: Normal heart sounds. No murmur heard.  Pulmonary:      Effort: Pulmonary effort is normal. No respiratory distress.      Breath sounds: Normal breath sounds. No decreased breath sounds, wheezing, rhonchi or rales.   Chest:   Breasts:     Right: Normal.      Left: Normal.   Abdominal:      General: Bowel sounds are normal.      Palpations: Abdomen is soft.      Tenderness: There is no abdominal tenderness. There is no right CVA tenderness, left CVA tenderness, guarding or rebound.   Musculoskeletal:      Cervical back: Neck supple.      Right lower leg: No edema.      Left lower leg: No edema.   Lymphadenopathy:      Cervical: No cervical adenopathy.      Upper Body:      Right upper body: No supraclavicular or axillary adenopathy.      Left upper body: No supraclavicular or axillary adenopathy.   Skin:     General: Skin is warm.      Nails: There is no clubbing.   Neurological:      General: No focal deficit present.      Mental Status: He is alert and oriented to person, place, and time.      Sensory: Sensation is intact.      Motor: Motor function is intact.      Coordination: Coordination is intact.   Psychiatric:         Attention and Perception: Attention and perception normal.         Mood and Affect: Mood normal.         Speech: Speech normal.         Behavior: Behavior normal. Behavior is cooperative.         Thought Content: Thought content normal.        Result Review :                Assessment and Plan   Diagnoses and all orders for this visit:    1. Primary hypertension (Primary)  Assessment & Plan:  Hypertension is stable and controlled  Continue current treatment regimen.  Dietary sodium restriction.  Blood pressure will be  reassessed in 6 months.    Orders:  -     amLODIPine (NORVASC) 5 MG tablet; Take 1 tablet by mouth Daily for 90 days.  Dispense: 90 tablet; Refill: 3    2. Upper respiratory tract infection, unspecified type  -     loratadine (CLARITIN) 10 MG tablet; Take 1 tablet by mouth Daily for 90 days.  Dispense: 90 tablet; Refill: 3  -     dexAMETHasone (DECADRON) injection 8 mg  -     azithromycin (Zithromax) 250 MG tablet; Take 2 tablets the first day, then 1 tablet daily for 4 days.  Dispense: 6 tablet; Refill: 0           I spent 15 minutes caring for Tobias on this date of service. This time includes time spent by me in the following activities:preparing for the visit, performing a medically appropriate examination and/or evaluation , counseling and educating the patient/family/caregiver, ordering medications, tests, or procedures, and documenting information in the medical record  Follow Up   No follow-ups on file.  Patient was given instructions and counseling regarding his condition or for health maintenance advice. Please see specific information pulled into the AVS if appropriate.     The patient is advised to continue all of her regular medications as prescribed. She was counseled regarding the importance of diet, exercise and medication compliance.    The patient was advised rest, increase oral fluids, may take tylenol or ibuprofen as needed. RTC as needed if symptoms worsen or fail to improve.      This document has been electronically signed by Mio Joseph MD  October 9, 2024 16:53 EDT

## 2024-10-15 RX ORDER — SILDENAFIL 50 MG/1
50 TABLET, FILM COATED ORAL DAILY
Qty: 30 TABLET | Refills: 0 | Status: SHIPPED | OUTPATIENT
Start: 2024-10-15 | End: 2024-11-14